# Patient Record
Sex: FEMALE | Race: BLACK OR AFRICAN AMERICAN | NOT HISPANIC OR LATINO | Employment: FULL TIME | URBAN - METROPOLITAN AREA
[De-identification: names, ages, dates, MRNs, and addresses within clinical notes are randomized per-mention and may not be internally consistent; named-entity substitution may affect disease eponyms.]

---

## 2017-04-11 ENCOUNTER — HISTORICAL (OUTPATIENT)
Dept: ADMINISTRATIVE | Facility: HOSPITAL | Age: 37
End: 2017-04-11

## 2017-09-19 ENCOUNTER — HISTORICAL (OUTPATIENT)
Dept: ADMINISTRATIVE | Facility: HOSPITAL | Age: 37
End: 2017-09-19

## 2018-05-02 ENCOUNTER — HISTORICAL (OUTPATIENT)
Dept: ADMINISTRATIVE | Facility: HOSPITAL | Age: 38
End: 2018-05-02

## 2018-05-02 LAB
ABS NEUT (OLG): 3.33 X10(3)/MCL (ref 2.1–9.2)
ALBUMIN SERPL-MCNC: 3.3 GM/DL (ref 3.4–5)
ALBUMIN/GLOB SERPL: 0.9 {RATIO}
ALP SERPL-CCNC: 57 UNIT/L (ref 38–126)
ALT SERPL-CCNC: 30 UNIT/L (ref 12–78)
AST SERPL-CCNC: 15 UNIT/L (ref 15–37)
BASOPHILS # BLD AUTO: 0 X10(3)/MCL (ref 0–0.2)
BASOPHILS NFR BLD AUTO: 0 %
BILIRUB SERPL-MCNC: 0.3 MG/DL (ref 0.2–1)
BILIRUBIN DIRECT+TOT PNL SERPL-MCNC: 0.1 MG/DL (ref 0–0.2)
BILIRUBIN DIRECT+TOT PNL SERPL-MCNC: 0.2 MG/DL (ref 0–0.8)
BUN SERPL-MCNC: 11 MG/DL (ref 7–18)
CALCIUM SERPL-MCNC: 8.7 MG/DL (ref 8.5–10.1)
CHLORIDE SERPL-SCNC: 110 MMOL/L (ref 98–107)
CHOLEST SERPL-MCNC: 183 MG/DL (ref 0–200)
CHOLEST/HDLC SERPL: 2.7 {RATIO} (ref 0–4)
CO2 SERPL-SCNC: 27 MMOL/L (ref 21–32)
CREAT SERPL-MCNC: 0.81 MG/DL (ref 0.55–1.02)
EOSINOPHIL # BLD AUTO: 0.2 X10(3)/MCL (ref 0–0.9)
EOSINOPHIL NFR BLD AUTO: 3 %
ERYTHROCYTE [DISTWIDTH] IN BLOOD BY AUTOMATED COUNT: 14.9 % (ref 11.5–17)
GLOBULIN SER-MCNC: 3.5 GM/DL (ref 2.4–3.5)
GLUCOSE SERPL-MCNC: 87 MG/DL (ref 74–106)
HCT VFR BLD AUTO: 39 % (ref 37–47)
HDLC SERPL-MCNC: 69 MG/DL (ref 35–60)
HGB BLD-MCNC: 12.5 GM/DL (ref 12–16)
IRON SATN MFR SERPL: 7.3 % (ref 20–50)
IRON SERPL-MCNC: 25 MCG/DL (ref 50–175)
LDLC SERPL CALC-MCNC: 98 MG/DL (ref 0–129)
LYMPHOCYTES # BLD AUTO: 1.9 X10(3)/MCL (ref 0.6–4.6)
LYMPHOCYTES NFR BLD AUTO: 32 %
MCH RBC QN AUTO: 27.5 PG (ref 27–31)
MCHC RBC AUTO-ENTMCNC: 32.1 GM/DL (ref 33–36)
MCV RBC AUTO: 85.9 FL (ref 80–94)
MONOCYTES # BLD AUTO: 0.5 X10(3)/MCL (ref 0.1–1.3)
MONOCYTES NFR BLD AUTO: 9 %
NEUTROPHILS # BLD AUTO: 3.33 X10(3)/MCL (ref 1.4–7.9)
NEUTROPHILS NFR BLD AUTO: 56 %
PLATELET # BLD AUTO: 303 X10(3)/MCL (ref 130–400)
PMV BLD AUTO: 9.2 FL (ref 9.4–12.4)
POTASSIUM SERPL-SCNC: 4.5 MMOL/L (ref 3.5–5.1)
PROT SERPL-MCNC: 6.8 GM/DL (ref 6.4–8.2)
RBC # BLD AUTO: 4.54 X10(6)/MCL (ref 4.2–5.4)
SODIUM SERPL-SCNC: 141 MMOL/L (ref 136–145)
T4 FREE SERPL-MCNC: 1.2 NG/DL (ref 0.76–4.46)
TIBC SERPL-MCNC: 341 MCG/DL (ref 250–450)
TRANSFERRIN SERPL-MCNC: 268 MG/DL (ref 200–360)
TRIGL SERPL-MCNC: 78 MG/DL (ref 30–150)
TSH SERPL-ACNC: 1.86 MIU/L (ref 0.36–3.74)
VLDLC SERPL CALC-MCNC: 16 MG/DL
WBC # SPEC AUTO: 6 X10(3)/MCL (ref 4.5–11.5)

## 2018-10-11 ENCOUNTER — HISTORICAL (OUTPATIENT)
Dept: ADMINISTRATIVE | Facility: HOSPITAL | Age: 38
End: 2018-10-11

## 2019-02-05 ENCOUNTER — HISTORICAL (OUTPATIENT)
Dept: ADMINISTRATIVE | Facility: HOSPITAL | Age: 39
End: 2019-02-05

## 2019-02-05 LAB
ABS NEUT (OLG): 2.77 X10(3)/MCL (ref 2.1–9.2)
ALBUMIN SERPL-MCNC: 3.4 GM/DL (ref 3.4–5)
ALBUMIN/GLOB SERPL: 0.9 RATIO (ref 1.1–2)
ALP SERPL-CCNC: 64 UNIT/L (ref 38–126)
ALT SERPL-CCNC: 26 UNIT/L (ref 12–78)
AST SERPL-CCNC: 9 UNIT/L (ref 15–37)
BASOPHILS # BLD AUTO: 0 X10(3)/MCL (ref 0–0.2)
BASOPHILS NFR BLD AUTO: 0 %
BILIRUB SERPL-MCNC: 0.4 MG/DL (ref 0.2–1)
BILIRUBIN DIRECT+TOT PNL SERPL-MCNC: 0.1 MG/DL (ref 0–0.5)
BILIRUBIN DIRECT+TOT PNL SERPL-MCNC: 0.3 MG/DL (ref 0–0.8)
BUN SERPL-MCNC: 9 MG/DL (ref 7–18)
CALCIUM SERPL-MCNC: 9.1 MG/DL (ref 8.5–10.1)
CHLORIDE SERPL-SCNC: 106 MMOL/L (ref 98–107)
CHOLEST SERPL-MCNC: 203 MG/DL (ref 0–200)
CHOLEST/HDLC SERPL: 2.6 {RATIO} (ref 0–4)
CO2 SERPL-SCNC: 28 MMOL/L (ref 21–32)
CREAT SERPL-MCNC: 0.95 MG/DL (ref 0.55–1.02)
EOSINOPHIL # BLD AUTO: 0.1 X10(3)/MCL (ref 0–0.9)
EOSINOPHIL NFR BLD AUTO: 2 %
ERYTHROCYTE [DISTWIDTH] IN BLOOD BY AUTOMATED COUNT: 13.8 % (ref 11.5–17)
GLOBULIN SER-MCNC: 3.9 GM/DL (ref 2.4–3.5)
GLUCOSE SERPL-MCNC: 90 MG/DL (ref 74–106)
HCT VFR BLD AUTO: 43.8 % (ref 37–47)
HDLC SERPL-MCNC: 78 MG/DL (ref 35–60)
HGB BLD-MCNC: 13.8 GM/DL (ref 12–16)
LDLC SERPL CALC-MCNC: 108 MG/DL (ref 0–129)
LYMPHOCYTES # BLD AUTO: 1.8 X10(3)/MCL (ref 0.6–4.6)
LYMPHOCYTES NFR BLD AUTO: 36 %
MCH RBC QN AUTO: 27.8 PG (ref 27–31)
MCHC RBC AUTO-ENTMCNC: 31.5 GM/DL (ref 33–36)
MCV RBC AUTO: 88.1 FL (ref 80–94)
MONOCYTES # BLD AUTO: 0.4 X10(3)/MCL (ref 0.1–1.3)
MONOCYTES NFR BLD AUTO: 8 %
NEUTROPHILS # BLD AUTO: 2.77 X10(3)/MCL (ref 2.1–9.2)
NEUTROPHILS NFR BLD AUTO: 54 %
PLATELET # BLD AUTO: 285 X10(3)/MCL (ref 130–400)
PMV BLD AUTO: 9.3 FL (ref 9.4–12.4)
POTASSIUM SERPL-SCNC: 4.8 MMOL/L (ref 3.5–5.1)
PROT SERPL-MCNC: 7.3 GM/DL (ref 6.4–8.2)
RBC # BLD AUTO: 4.97 X10(6)/MCL (ref 4.2–5.4)
SODIUM SERPL-SCNC: 140 MMOL/L (ref 136–145)
T4 FREE SERPL-MCNC: 1.28 NG/DL (ref 0.76–1.46)
TRIGL SERPL-MCNC: 84 MG/DL (ref 30–150)
TSH SERPL-ACNC: 1.52 MIU/L (ref 0.36–3.74)
VLDLC SERPL CALC-MCNC: 17 MG/DL
WBC # SPEC AUTO: 5.2 X10(3)/MCL (ref 4.5–11.5)

## 2019-10-15 ENCOUNTER — HISTORICAL (OUTPATIENT)
Dept: ADMINISTRATIVE | Facility: HOSPITAL | Age: 39
End: 2019-10-15

## 2020-01-07 ENCOUNTER — HISTORICAL (OUTPATIENT)
Dept: ADMINISTRATIVE | Facility: HOSPITAL | Age: 40
End: 2020-01-07

## 2020-02-11 ENCOUNTER — HISTORICAL (OUTPATIENT)
Dept: ADMINISTRATIVE | Facility: HOSPITAL | Age: 40
End: 2020-02-11

## 2020-05-28 ENCOUNTER — HISTORICAL (OUTPATIENT)
Dept: ADMINISTRATIVE | Facility: HOSPITAL | Age: 40
End: 2020-05-28

## 2020-05-28 LAB
ABS NEUT (OLG): 3.53 X10(3)/MCL (ref 2.1–9.2)
ALBUMIN SERPL-MCNC: 3.4 GM/DL (ref 3.5–5)
ALBUMIN/GLOB SERPL: 0.9 RATIO (ref 1.1–2)
ALP SERPL-CCNC: 60 UNIT/L (ref 40–150)
ALT SERPL-CCNC: 19 UNIT/L (ref 0–55)
AST SERPL-CCNC: 10 UNIT/L (ref 5–34)
BASOPHILS # BLD AUTO: 0 X10(3)/MCL (ref 0–0.2)
BASOPHILS NFR BLD AUTO: 0 %
BILIRUB SERPL-MCNC: 0.2 MG/DL
BILIRUBIN DIRECT+TOT PNL SERPL-MCNC: 0.1 MG/DL (ref 0–0.5)
BILIRUBIN DIRECT+TOT PNL SERPL-MCNC: 0.1 MG/DL (ref 0–0.8)
BUN SERPL-MCNC: 10.1 MG/DL (ref 7–18.7)
CALCIUM SERPL-MCNC: 8.8 MG/DL (ref 8.4–10.2)
CHLORIDE SERPL-SCNC: 107 MMOL/L (ref 98–107)
CHOLEST SERPL-MCNC: 173 MG/DL
CHOLEST/HDLC SERPL: 4 {RATIO} (ref 0–5)
CO2 SERPL-SCNC: 27 MMOL/L (ref 22–29)
CREAT SERPL-MCNC: 0.75 MG/DL (ref 0.55–1.02)
EOSINOPHIL # BLD AUTO: 0.1 X10(3)/MCL (ref 0–0.9)
EOSINOPHIL NFR BLD AUTO: 1 %
ERYTHROCYTE [DISTWIDTH] IN BLOOD BY AUTOMATED COUNT: 15 % (ref 11.5–17)
GLOBULIN SER-MCNC: 3.6 GM/DL (ref 2.4–3.5)
GLUCOSE SERPL-MCNC: 101 MG/DL (ref 74–100)
HCT VFR BLD AUTO: 40.8 % (ref 37–47)
HDLC SERPL-MCNC: 48 MG/DL (ref 35–60)
HGB BLD-MCNC: 12.6 GM/DL (ref 12–16)
IRON SERPL-MCNC: 27 UG/DL (ref 50–170)
LDLC SERPL CALC-MCNC: 113 MG/DL (ref 50–140)
LYMPHOCYTES # BLD AUTO: 1.9 X10(3)/MCL (ref 0.6–4.6)
LYMPHOCYTES NFR BLD AUTO: 32 %
MCH RBC QN AUTO: 27 PG (ref 27–31)
MCHC RBC AUTO-ENTMCNC: 30.9 GM/DL (ref 33–36)
MCV RBC AUTO: 87.6 FL (ref 80–94)
MONOCYTES # BLD AUTO: 0.5 X10(3)/MCL (ref 0.1–1.3)
MONOCYTES NFR BLD AUTO: 8 %
NEUTROPHILS # BLD AUTO: 3.53 X10(3)/MCL (ref 2.1–9.2)
NEUTROPHILS NFR BLD AUTO: 59 %
PLATELET # BLD AUTO: 298 X10(3)/MCL (ref 130–400)
PMV BLD AUTO: 9.7 FL (ref 9.4–12.4)
POTASSIUM SERPL-SCNC: 4.7 MMOL/L (ref 3.5–5.1)
PROT SERPL-MCNC: 7 GM/DL (ref 6.4–8.3)
RBC # BLD AUTO: 4.66 X10(6)/MCL (ref 4.2–5.4)
SODIUM SERPL-SCNC: 140 MMOL/L (ref 136–145)
T4 FREE SERPL-MCNC: 1.18 NG/DL (ref 0.7–1.48)
TRIGL SERPL-MCNC: 61 MG/DL (ref 37–140)
TSH SERPL-ACNC: 0.2 UIU/ML (ref 0.35–4.94)
VLDLC SERPL CALC-MCNC: 12 MG/DL
WBC # SPEC AUTO: 6 X10(3)/MCL (ref 4.5–11.5)

## 2021-03-30 LAB — SARS-COV-2 RNA RESP QL NAA+PROBE: NEGATIVE

## 2021-06-16 ENCOUNTER — HISTORICAL (OUTPATIENT)
Dept: ADMINISTRATIVE | Facility: HOSPITAL | Age: 41
End: 2021-06-16

## 2021-06-16 LAB
ABS NEUT (OLG): 3.82 X10(3)/MCL (ref 2.1–9.2)
ALBUMIN SERPL-MCNC: 2.9 GM/DL (ref 3.5–5)
ALBUMIN/GLOB SERPL: 0.8 RATIO (ref 1.1–2)
ALP SERPL-CCNC: 63 UNIT/L (ref 40–150)
ALT SERPL-CCNC: 16 UNIT/L (ref 0–55)
AST SERPL-CCNC: 10 UNIT/L (ref 5–34)
BASOPHILS # BLD AUTO: 0 X10(3)/MCL (ref 0–0.2)
BASOPHILS NFR BLD AUTO: 0 %
BILIRUB SERPL-MCNC: 0.2 MG/DL
BILIRUBIN DIRECT+TOT PNL SERPL-MCNC: 0.1 MG/DL (ref 0–0.5)
BILIRUBIN DIRECT+TOT PNL SERPL-MCNC: 0.1 MG/DL (ref 0–0.8)
BUN SERPL-MCNC: 9.2 MG/DL (ref 7–18.7)
CALCIUM SERPL-MCNC: 9 MG/DL (ref 8.4–10.2)
CHLORIDE SERPL-SCNC: 108 MMOL/L (ref 98–107)
CHOLEST SERPL-MCNC: 174 MG/DL
CHOLEST/HDLC SERPL: 3 {RATIO} (ref 0–5)
CO2 SERPL-SCNC: 26 MMOL/L (ref 22–29)
CREAT SERPL-MCNC: 0.81 MG/DL (ref 0.55–1.02)
DEPRECATED CALCIDIOL+CALCIFEROL SERPL-MC: 8.8 NG/ML (ref 30–80)
EOSINOPHIL # BLD AUTO: 0.1 X10(3)/MCL (ref 0–0.9)
EOSINOPHIL NFR BLD AUTO: 2 %
ERYTHROCYTE [DISTWIDTH] IN BLOOD BY AUTOMATED COUNT: 15 % (ref 11.5–17)
EST. AVERAGE GLUCOSE BLD GHB EST-MCNC: 114 MG/DL
GLOBULIN SER-MCNC: 3.7 GM/DL (ref 2.4–3.5)
GLUCOSE SERPL-MCNC: 109 MG/DL (ref 74–100)
HBA1C MFR BLD: 5.6 %
HCT VFR BLD AUTO: 37.7 % (ref 37–47)
HDLC SERPL-MCNC: 53 MG/DL (ref 35–60)
HGB BLD-MCNC: 11.7 GM/DL (ref 12–16)
LDLC SERPL CALC-MCNC: 104 MG/DL (ref 50–140)
LYMPHOCYTES # BLD AUTO: 2 X10(3)/MCL (ref 0.6–4.6)
LYMPHOCYTES NFR BLD AUTO: 31 %
MCH RBC QN AUTO: 26.2 PG (ref 27–31)
MCHC RBC AUTO-ENTMCNC: 31 GM/DL (ref 33–36)
MCV RBC AUTO: 84.5 FL (ref 80–94)
MONOCYTES # BLD AUTO: 0.4 X10(3)/MCL (ref 0.1–1.3)
MONOCYTES NFR BLD AUTO: 6 %
NEUTROPHILS # BLD AUTO: 3.82 X10(3)/MCL (ref 2.1–9.2)
NEUTROPHILS NFR BLD AUTO: 60 %
PLATELET # BLD AUTO: 335 X10(3)/MCL (ref 130–400)
PMV BLD AUTO: 9.5 FL (ref 9.4–12.4)
POTASSIUM SERPL-SCNC: 4.7 MMOL/L (ref 3.5–5.1)
PROT SERPL-MCNC: 6.6 GM/DL (ref 6.4–8.3)
RBC # BLD AUTO: 4.46 X10(6)/MCL (ref 4.2–5.4)
SODIUM SERPL-SCNC: 140 MMOL/L (ref 136–145)
T4 FREE SERPL-MCNC: 0.84 NG/DL (ref 0.7–1.48)
TRIGL SERPL-MCNC: 83 MG/DL (ref 37–140)
TSH SERPL-ACNC: 3.68 UIU/ML (ref 0.35–4.94)
VLDLC SERPL CALC-MCNC: 17 MG/DL
WBC # SPEC AUTO: 6.4 X10(3)/MCL (ref 4.5–11.5)

## 2022-01-20 ENCOUNTER — HISTORICAL (OUTPATIENT)
Dept: ADMINISTRATIVE | Facility: HOSPITAL | Age: 42
End: 2022-01-20

## 2022-01-20 LAB
ABS NEUT (OLG): 3.91 X10(3)/MCL (ref 2.1–9.2)
ALBUMIN SERPL-MCNC: 3.3 GM/DL (ref 3.5–5)
ALBUMIN/GLOB SERPL: 0.8 RATIO (ref 1.1–2)
ALP SERPL-CCNC: 61 UNIT/L (ref 40–150)
ALT SERPL-CCNC: 13 UNIT/L (ref 0–55)
ANTINUCLEAR ANTIBODY SCREEN (OHS): NEGATIVE
APPEARANCE, UA: ABNORMAL
AST SERPL-CCNC: 11 UNIT/L (ref 5–34)
BACTERIA SPEC CULT: ABNORMAL /HPF
BASOPHILS # BLD AUTO: 0 X10(3)/MCL (ref 0–0.2)
BASOPHILS NFR BLD AUTO: 1 %
BILIRUB SERPL-MCNC: 0.4 MG/DL
BILIRUB UR QL STRIP: NEGATIVE
BILIRUBIN DIRECT+TOT PNL SERPL-MCNC: 0.2 MG/DL (ref 0–0.5)
BILIRUBIN DIRECT+TOT PNL SERPL-MCNC: 0.2 MG/DL (ref 0–0.8)
BUN SERPL-MCNC: 7.7 MG/DL (ref 7–18.7)
CALCIUM SERPL-MCNC: 8.9 MG/DL (ref 8.7–10.5)
CHLORIDE SERPL-SCNC: 107 MMOL/L (ref 98–107)
CHOLEST SERPL-MCNC: 186 MG/DL
CHOLEST/HDLC SERPL: 3 {RATIO} (ref 0–5)
CO2 SERPL-SCNC: 22 MMOL/L (ref 22–29)
COLOR UR: YELLOW
CREAT SERPL-MCNC: 0.79 MG/DL (ref 0.55–1.02)
DEPRECATED CALCIDIOL+CALCIFEROL SERPL-MC: 8.1 NG/ML (ref 30–80)
DSDNA ANTIBODY (OHS): NEGATIVE
EOSINOPHIL # BLD AUTO: 0.1 X10(3)/MCL (ref 0–0.9)
EOSINOPHIL NFR BLD AUTO: 2 %
ERYTHROCYTE [DISTWIDTH] IN BLOOD BY AUTOMATED COUNT: 15.1 % (ref 11.5–17)
ERYTHROCYTE [SEDIMENTATION RATE] IN BLOOD: 34 MM/HR (ref 0–20)
EST. AVERAGE GLUCOSE BLD GHB EST-MCNC: 99.7 MG/DL
GLOBULIN SER-MCNC: 4.2 GM/DL (ref 2.4–3.5)
GLUCOSE (UA): NEGATIVE
GLUCOSE SERPL-MCNC: 93 MG/DL (ref 74–100)
HBA1C MFR BLD: 5.1 %
HCT VFR BLD AUTO: 38.1 % (ref 37–47)
HDLC SERPL-MCNC: 55 MG/DL (ref 35–60)
HGB BLD-MCNC: 12 GM/DL (ref 12–16)
HGB UR QL STRIP: ABNORMAL
KETONES UR QL STRIP: NEGATIVE
LDLC SERPL CALC-MCNC: 114 MG/DL (ref 50–140)
LEUKOCYTE ESTERASE UR QL STRIP: ABNORMAL
LYMPHOCYTES # BLD AUTO: 2.3 X10(3)/MCL (ref 0.6–4.6)
LYMPHOCYTES NFR BLD AUTO: 33 %
MCH RBC QN AUTO: 25.9 PG (ref 27–31)
MCHC RBC AUTO-ENTMCNC: 31.5 GM/DL (ref 33–36)
MCV RBC AUTO: 82.3 FL (ref 80–94)
MONOCYTES # BLD AUTO: 0.4 X10(3)/MCL (ref 0.1–1.3)
MONOCYTES NFR BLD AUTO: 7 %
NEUTROPHILS # BLD AUTO: 3.91 X10(3)/MCL (ref 2.1–9.2)
NEUTROPHILS NFR BLD AUTO: 57 %
NITRITE UR QL STRIP: NEGATIVE
PH UR STRIP: 5.5 [PH] (ref 5–9)
PLATELET # BLD AUTO: 361 X10(3)/MCL (ref 130–400)
PMV BLD AUTO: 10 FL (ref 9.4–12.4)
POTASSIUM SERPL-SCNC: 3.7 MMOL/L (ref 3.5–5.1)
PROT SERPL-MCNC: 7.5 GM/DL (ref 6.4–8.3)
PROT UR QL STRIP: NEGATIVE
RBC # BLD AUTO: 4.63 X10(6)/MCL (ref 4.2–5.4)
RBC #/AREA URNS HPF: ABNORMAL /HPF
RHEUMATOID FACT SERPL-ACNC: <13 IU/ML
SODIUM SERPL-SCNC: 139 MMOL/L (ref 136–145)
SP GR UR STRIP: 1.02 (ref 1–1.03)
SQUAMOUS EPITHELIAL, UA: ABNORMAL /HPF
T4 FREE SERPL-MCNC: 1.05 NG/DL (ref 0.7–1.48)
TRIGL SERPL-MCNC: 86 MG/DL (ref 37–140)
TSH SERPL-ACNC: 1.15 UIU/ML (ref 0.35–4.94)
UA WBC MAN: ABNORMAL /HPF
URATE SERPL-MCNC: 5.3 MG/DL (ref 2.6–6)
UROBILINOGEN UR STRIP-ACNC: 0.2
VLDLC SERPL CALC-MCNC: 17 MG/DL
WBC # SPEC AUTO: 6.8 X10(3)/MCL (ref 4.5–11.5)
WBC #/AREA URNS AUTO: ABNORMAL /HPF (ref 0–3)
WBC #/AREA URNS HPF: ABNORMAL /HPF (ref 0–3)

## 2022-01-22 LAB — FINAL CULTURE: NORMAL

## 2022-04-11 ENCOUNTER — HISTORICAL (OUTPATIENT)
Dept: ADMINISTRATIVE | Facility: HOSPITAL | Age: 42
End: 2022-04-11
Payer: COMMERCIAL

## 2022-04-28 VITALS
DIASTOLIC BLOOD PRESSURE: 81 MMHG | SYSTOLIC BLOOD PRESSURE: 105 MMHG | WEIGHT: 293 LBS | OXYGEN SATURATION: 98 % | BODY MASS INDEX: 48.82 KG/M2 | HEIGHT: 65 IN

## 2022-09-01 ENCOUNTER — LAB VISIT (OUTPATIENT)
Dept: LAB | Facility: HOSPITAL | Age: 42
End: 2022-09-01
Attending: OBSTETRICS & GYNECOLOGY
Payer: COMMERCIAL

## 2022-09-01 DIAGNOSIS — I51.9 MYXEDEMA HEART DISEASE: ICD-10-CM

## 2022-09-01 DIAGNOSIS — E05.90 HYPERTHYROIDISM, MATERNAL, ANTEPARTUM, SECOND TRIMESTER: Primary | ICD-10-CM

## 2022-09-01 DIAGNOSIS — E03.9 MYXEDEMA HEART DISEASE: ICD-10-CM

## 2022-09-01 LAB
T4 FREE SERPL-MCNC: 0.82 NG/DL (ref 0.7–1.48)
TSH SERPL-ACNC: 1.58 UIU/ML (ref 0.35–4.94)

## 2022-09-01 PROCEDURE — 36415 COLL VENOUS BLD VENIPUNCTURE: CPT

## 2022-09-01 PROCEDURE — 84439 ASSAY OF FREE THYROXINE: CPT

## 2022-09-01 PROCEDURE — 84443 ASSAY THYROID STIM HORMONE: CPT

## 2022-09-21 ENCOUNTER — HISTORICAL (OUTPATIENT)
Dept: ADMINISTRATIVE | Facility: HOSPITAL | Age: 42
End: 2022-09-21
Payer: COMMERCIAL

## 2023-01-20 ENCOUNTER — OFFICE VISIT (OUTPATIENT)
Dept: ORTHOPEDICS | Facility: CLINIC | Age: 43
End: 2023-01-20
Payer: COMMERCIAL

## 2023-01-20 ENCOUNTER — HOSPITAL ENCOUNTER (OUTPATIENT)
Dept: RADIOLOGY | Facility: CLINIC | Age: 43
Discharge: HOME | End: 2023-01-20
Attending: ORTHOPAEDIC SURGERY
Payer: COMMERCIAL

## 2023-01-20 VITALS
SYSTOLIC BLOOD PRESSURE: 138 MMHG | HEIGHT: 64 IN | WEIGHT: 293 LBS | DIASTOLIC BLOOD PRESSURE: 101 MMHG | BODY MASS INDEX: 50.02 KG/M2 | HEART RATE: 73 BPM

## 2023-01-20 DIAGNOSIS — M25.561 ACUTE PAIN OF RIGHT KNEE: ICD-10-CM

## 2023-01-20 DIAGNOSIS — E66.01 MORBID OBESITY: ICD-10-CM

## 2023-01-20 DIAGNOSIS — M17.0 PRIMARY OSTEOARTHRITIS OF BOTH KNEES: Primary | ICD-10-CM

## 2023-01-20 DIAGNOSIS — M25.562 ACUTE PAIN OF LEFT KNEE: ICD-10-CM

## 2023-01-20 PROCEDURE — 3075F PR MOST RECENT SYSTOLIC BLOOD PRESS GE 130-139MM HG: ICD-10-PCS | Mod: CPTII,,, | Performed by: ORTHOPAEDIC SURGERY

## 2023-01-20 PROCEDURE — 3075F SYST BP GE 130 - 139MM HG: CPT | Mod: CPTII,,, | Performed by: ORTHOPAEDIC SURGERY

## 2023-01-20 PROCEDURE — 1159F MED LIST DOCD IN RCRD: CPT | Mod: CPTII,,, | Performed by: ORTHOPAEDIC SURGERY

## 2023-01-20 PROCEDURE — 3080F PR MOST RECENT DIASTOLIC BLOOD PRESSURE >= 90 MM HG: ICD-10-PCS | Mod: CPTII,,, | Performed by: ORTHOPAEDIC SURGERY

## 2023-01-20 PROCEDURE — 73562 XR KNEE 3 VIEW BILATERAL: ICD-10-PCS | Mod: ,,, | Performed by: ORTHOPAEDIC SURGERY

## 2023-01-20 PROCEDURE — 20610 DRAIN/INJ JOINT/BURSA W/O US: CPT | Mod: 50,,, | Performed by: ORTHOPAEDIC SURGERY

## 2023-01-20 PROCEDURE — 3080F DIAST BP >= 90 MM HG: CPT | Mod: CPTII,,, | Performed by: ORTHOPAEDIC SURGERY

## 2023-01-20 PROCEDURE — 3008F BODY MASS INDEX DOCD: CPT | Mod: CPTII,,, | Performed by: ORTHOPAEDIC SURGERY

## 2023-01-20 PROCEDURE — 20610 LARGE JOINT ASPIRATION/INJECTION: BILATERAL KNEE: ICD-10-PCS | Mod: 50,,, | Performed by: ORTHOPAEDIC SURGERY

## 2023-01-20 PROCEDURE — 99214 OFFICE O/P EST MOD 30 MIN: CPT | Mod: 25,,, | Performed by: ORTHOPAEDIC SURGERY

## 2023-01-20 PROCEDURE — 1160F RVW MEDS BY RX/DR IN RCRD: CPT | Mod: CPTII,,, | Performed by: ORTHOPAEDIC SURGERY

## 2023-01-20 PROCEDURE — 4010F PR ACE/ARB THEARPY RXD/TAKEN: ICD-10-PCS | Mod: CPTII,,, | Performed by: ORTHOPAEDIC SURGERY

## 2023-01-20 PROCEDURE — 73562 X-RAY EXAM OF KNEE 3: CPT | Mod: ,,, | Performed by: ORTHOPAEDIC SURGERY

## 2023-01-20 PROCEDURE — 99214 PR OFFICE/OUTPT VISIT, EST, LEVL IV, 30-39 MIN: ICD-10-PCS | Mod: 25,,, | Performed by: ORTHOPAEDIC SURGERY

## 2023-01-20 PROCEDURE — 4010F ACE/ARB THERAPY RXD/TAKEN: CPT | Mod: CPTII,,, | Performed by: ORTHOPAEDIC SURGERY

## 2023-01-20 PROCEDURE — 1159F PR MEDICATION LIST DOCUMENTED IN MEDICAL RECORD: ICD-10-PCS | Mod: CPTII,,, | Performed by: ORTHOPAEDIC SURGERY

## 2023-01-20 PROCEDURE — 3008F PR BODY MASS INDEX (BMI) DOCUMENTED: ICD-10-PCS | Mod: CPTII,,, | Performed by: ORTHOPAEDIC SURGERY

## 2023-01-20 PROCEDURE — 1160F PR REVIEW ALL MEDS BY PRESCRIBER/CLIN PHARMACIST DOCUMENTED: ICD-10-PCS | Mod: CPTII,,, | Performed by: ORTHOPAEDIC SURGERY

## 2023-01-20 RX ORDER — PROGESTERONE 200 MG/1
CAPSULE ORAL
COMMUNITY
Start: 2022-08-09

## 2023-01-20 RX ORDER — LEVOTHYROXINE SODIUM 125 UG/1
125 TABLET ORAL
COMMUNITY
Start: 2022-11-09 | End: 2024-03-21

## 2023-01-20 RX ORDER — ACETAMINOPHEN AND CODEINE PHOSPHATE 120; 12 MG/5ML; MG/5ML
1 SOLUTION ORAL
COMMUNITY
Start: 2023-01-14

## 2023-01-20 RX ORDER — LISINOPRIL 5 MG/1
5 TABLET ORAL
COMMUNITY
Start: 2023-01-16

## 2023-01-20 RX ORDER — BETAMETHASONE SODIUM PHOSPHATE AND BETAMETHASONE ACETATE 3; 3 MG/ML; MG/ML
6 INJECTION, SUSPENSION INTRA-ARTICULAR; INTRALESIONAL; INTRAMUSCULAR; SOFT TISSUE
Status: DISCONTINUED | OUTPATIENT
Start: 2023-01-20 | End: 2023-01-20 | Stop reason: HOSPADM

## 2023-01-20 RX ORDER — LIDOCAINE HYDROCHLORIDE 20 MG/ML
2 INJECTION, SOLUTION INFILTRATION; PERINEURAL
Status: DISCONTINUED | OUTPATIENT
Start: 2023-01-20 | End: 2023-01-20 | Stop reason: HOSPADM

## 2023-01-20 RX ADMIN — LIDOCAINE HYDROCHLORIDE 2 MG: 20 INJECTION, SOLUTION INFILTRATION; PERINEURAL at 08:01

## 2023-01-20 RX ADMIN — BETAMETHASONE SODIUM PHOSPHATE AND BETAMETHASONE ACETATE 6 MG: 3; 3 INJECTION, SUSPENSION INTRA-ARTICULAR; INTRALESIONAL; INTRAMUSCULAR; SOFT TISSUE at 08:01

## 2023-01-20 NOTE — PROCEDURES
Large Joint Aspiration/Injection: bilateral knee    Date/Time: 1/20/2023 8:45 AM  Performed by: Kan Chiu MD  Authorized by: Kan Chiu MD     Consent Done?:  Yes (Verbal)  Indications:  Arthritis and pain  Timeout: prior to procedure the correct patient, procedure, and site was verified    Prep: patient was prepped and draped in usual sterile fashion    Local anesthesia used?: No      Details:  Needle Size:  25 G  Approach:  Anterolateral  Location:  Knee  Laterality:  Bilateral  Site:  Bilateral knee  Medications (Right):  2 mg LIDOcaine HCL 20 mg/ml (2%) 20 mg/mL (2 %); 6 mg betamethasone acetate-betamethasone sodium phosphate 6 mg/mL  Medications (Left):  2 mg LIDOcaine HCL 20 mg/ml (2%) 20 mg/mL (2 %); 6 mg betamethasone acetate-betamethasone sodium phosphate 6 mg/mL  Patient tolerance:  Patient tolerated the procedure well with no immediate complications

## 2023-01-20 NOTE — PROGRESS NOTES
"History of present illness:    This is a 41-year-old female with a history of left knee osteoarthritis that has had multiple left knee Synvisc injections. She comes in today for another Synvisc injection. She continues to have symptoms related to her knee arthritis.  2023 this patient comes in today complaints of bilateral knee pain.  She is had Synvisc in the past and states it was helpful.  She is interested in do another round of Synvisc injections.    Past Medical History:   Diagnosis Date    Thyroid disease        Past Surgical History:   Procedure Laterality Date     SECTION         Current Outpatient Medications   Medication Sig    levothyroxine (SYNTHROID) 125 MCG tablet Take 125 mcg by mouth.    lisinopriL (PRINIVIL,ZESTRIL) 5 MG tablet Take 5 mg by mouth.    norethindrone (MICRONOR) 0.35 mg tablet Take 1 tablet by mouth.    progesterone (PROMETRIUM) 200 MG capsule INSERT 1 CAPSULE IN THE VAGINA EVERY NIGHT AT BEDTIME AS DIRECTED     No current facility-administered medications for this visit.       Review of patient's allergies indicates:  No Known Allergies    Family History   Problem Relation Age of Onset    No Known Problems Mother     No Known Problems Father     No Known Problems Sister     No Known Problems Brother        Social History     Socioeconomic History    Marital status: Single   Tobacco Use    Smoking status: Unknown   Substance and Sexual Activity    Alcohol use: Not Currently    Drug use: Never    Sexual activity: Not Currently       Chief Complaint:   Chief Complaint   Patient presents with    Right Knee - Follow-up     familia knee pain.previously has had synvisc inj which provided relief.        Consulting Physician: No ref. provider found      Review of Systems:    All review of systems negative except for those stated in the HPI    Examination:    Vital Signs:    Vitals:    23 0839   BP: (!) 138/101   Pulse: 73   Weight: 133.8 kg (295 lb)   Height: 5' 4" (1.626 m) "   PainSc:   4       Body mass index is 50.64 kg/m².    Physical Exam:   General: Well-developed, well-nourished.  Neuro: Alert and oriented x 3.  Psych: Normal mood and affect.  Left knee Exam:  Varus deformity. Range of motion from 0-130 degrees. Negative patella grind and equal subluxation of knee cap medial and lateral < 1cm. Negative patella tendon tenderness. Negative Lachman and anterior drawer test. Negative posterior drawer test. Negative varus and valgus stress test. Positive medial joint line tenderness. Negative lateral joint line tenderness. 4/5 strength and normal skin appearance. Sensibility normal.    Imaging: X-rays ordered and images interpreted today personally by me of X-rays demonstrate views of the knee joint space narrowing and degenerative changes with a varus deformity. There is also subchondral sclerosis and osteophyte formation which is equivalent to a Kellgren Chaz grade 4.         Assessment: Primary osteoarthritis of both knees  -     Large Joint Aspiration/Injection: bilateral knee    Morbid obesity    Acute pain of left knee  -     Cancel: X-Ray Knee 3 View Left; Future; Expected date: 01/20/2023    Acute pain of right knee  -     Cancel: X-Ray Knee 3 View Right; Future; Expected date: 01/20/2023        Plan:    We will proceed with bilateral knee steroid injections today.  I will bring her back in approximately 2 weeks for bilateral Synvisc injections.  Also did have a long conversation with her about weight loss.  I will make a referral to bariatric surgery and the weight loss clinic for this patient.      Large Joint Aspiration/Injection: bilateral knee    Date/Time: 1/20/2023 8:45 AM  Performed by: Kan Chiu MD  Authorized by: Kan Chiu MD     Consent Done?:  Yes (Verbal)  Indications:  Arthritis and pain  Timeout: prior to procedure the correct patient, procedure, and site was verified    Prep: patient was prepped and draped in usual sterile fashion    Local anesthesia  used?: No      Details:  Needle Size:  25 G  Approach:  Anterolateral  Location:  Knee  Laterality:  Bilateral  Site:  Bilateral knee  Medications (Right):  2 mg LIDOcaine HCL 20 mg/ml (2%) 20 mg/mL (2 %); 6 mg betamethasone acetate-betamethasone sodium phosphate 6 mg/mL  Medications (Left):  2 mg LIDOcaine HCL 20 mg/ml (2%) 20 mg/mL (2 %); 6 mg betamethasone acetate-betamethasone sodium phosphate 6 mg/mL  Patient tolerance:  Patient tolerated the procedure well with no immediate complications     DISCLAIMER: This note may have been dictated using voice recognition software and may contain grammatical errors.     NOTE: Consult report sent to referring provider via [employer] EMR.

## 2023-02-23 ENCOUNTER — OFFICE VISIT (OUTPATIENT)
Dept: ORTHOPEDICS | Facility: CLINIC | Age: 43
End: 2023-02-23
Payer: COMMERCIAL

## 2023-02-23 VITALS
WEIGHT: 290 LBS | HEART RATE: 81 BPM | HEIGHT: 64 IN | DIASTOLIC BLOOD PRESSURE: 82 MMHG | SYSTOLIC BLOOD PRESSURE: 118 MMHG | BODY MASS INDEX: 49.51 KG/M2

## 2023-02-23 DIAGNOSIS — E66.01 MORBID OBESITY: ICD-10-CM

## 2023-02-23 DIAGNOSIS — M17.0 PRIMARY OSTEOARTHRITIS OF BOTH KNEES: Primary | ICD-10-CM

## 2023-02-23 PROCEDURE — 4010F ACE/ARB THERAPY RXD/TAKEN: CPT | Mod: CPTII,,,

## 2023-02-23 PROCEDURE — 99499 NO LOS: ICD-10-PCS | Mod: ,,,

## 2023-02-23 PROCEDURE — 1159F PR MEDICATION LIST DOCUMENTED IN MEDICAL RECORD: ICD-10-PCS | Mod: CPTII,,,

## 2023-02-23 PROCEDURE — 3008F PR BODY MASS INDEX (BMI) DOCUMENTED: ICD-10-PCS | Mod: CPTII,,,

## 2023-02-23 PROCEDURE — 3079F PR MOST RECENT DIASTOLIC BLOOD PRESSURE 80-89 MM HG: ICD-10-PCS | Mod: CPTII,,,

## 2023-02-23 PROCEDURE — 1160F RVW MEDS BY RX/DR IN RCRD: CPT | Mod: CPTII,,,

## 2023-02-23 PROCEDURE — 3079F DIAST BP 80-89 MM HG: CPT | Mod: CPTII,,,

## 2023-02-23 PROCEDURE — 3074F PR MOST RECENT SYSTOLIC BLOOD PRESSURE < 130 MM HG: ICD-10-PCS | Mod: CPTII,,,

## 2023-02-23 PROCEDURE — 3074F SYST BP LT 130 MM HG: CPT | Mod: CPTII,,,

## 2023-02-23 PROCEDURE — 4010F PR ACE/ARB THEARPY RXD/TAKEN: ICD-10-PCS | Mod: CPTII,,,

## 2023-02-23 PROCEDURE — 20610 DRAIN/INJ JOINT/BURSA W/O US: CPT | Mod: 50,,,

## 2023-02-23 PROCEDURE — 1159F MED LIST DOCD IN RCRD: CPT | Mod: CPTII,,,

## 2023-02-23 PROCEDURE — 1160F PR REVIEW ALL MEDS BY PRESCRIBER/CLIN PHARMACIST DOCUMENTED: ICD-10-PCS | Mod: CPTII,,,

## 2023-02-23 PROCEDURE — 20610 LARGE JOINT ASPIRATION/INJECTION: BILATERAL KNEE: ICD-10-PCS | Mod: 50,,,

## 2023-02-23 PROCEDURE — 3008F BODY MASS INDEX DOCD: CPT | Mod: CPTII,,,

## 2023-02-23 PROCEDURE — 99499 UNLISTED E&M SERVICE: CPT | Mod: ,,,

## 2023-02-23 RX ORDER — LIDOCAINE HYDROCHLORIDE 20 MG/ML
3 INJECTION, SOLUTION INFILTRATION; PERINEURAL
Status: DISCONTINUED | OUTPATIENT
Start: 2023-02-23 | End: 2023-02-23 | Stop reason: HOSPADM

## 2023-02-23 RX ADMIN — LIDOCAINE HYDROCHLORIDE 3 MG: 20 INJECTION, SOLUTION INFILTRATION; PERINEURAL at 08:02

## 2023-02-23 NOTE — PROGRESS NOTES
[facility]  Orthopedic Clinic Note      Chief Complaint:   Chief Complaint   Patient presents with    Left Knee - Pain    Right Knee - Pain    Injections     here for bilateral knee injections, still has pain in both knees     Referring Physician: Kan Chiu MD      History of present illness:    This is a 41-year-old female with a history of left knee osteoarthritis that has had multiple left knee Synvisc injections. She comes in today for another Synvisc injection. She continues to have symptoms related to her knee arthritis.  2023 this patient comes in today complaints of bilateral knee pain.  She is had Synvisc in the past and states it was helpful.  She is interested in do another round of Synvisc injections.  2023 patient presents for bilateral knee viscosupplementation injections today.  She received bilateral knee corticosteroid injections at her prior visit.  She actually reports decent relief of symptoms following the corticosteroid injections.  However, her symptoms have slowly returned over the last few days.      Past Medical History:   Diagnosis Date    Thyroid disease        Past Surgical History:   Procedure Laterality Date     SECTION         Current Outpatient Medications   Medication Sig    levothyroxine (SYNTHROID) 125 MCG tablet Take 125 mcg by mouth.    lisinopriL (PRINIVIL,ZESTRIL) 5 MG tablet Take 5 mg by mouth.    norethindrone (MICRONOR) 0.35 mg tablet Take 1 tablet by mouth.    progesterone (PROMETRIUM) 200 MG capsule INSERT 1 CAPSULE IN THE VAGINA EVERY NIGHT AT BEDTIME AS DIRECTED     No current facility-administered medications for this visit.       Review of patient's allergies indicates:  No Known Allergies    Family History   Problem Relation Age of Onset    No Known Problems Mother     No Known Problems Father     No Known Problems Sister     No Known Problems Brother        Social History     Socioeconomic History    Marital status: Single  "  Tobacco Use    Smoking status: Never    Smokeless tobacco: Never   Substance and Sexual Activity    Alcohol use: Not Currently    Drug use: Never    Sexual activity: Not Currently       Chief Complaint:   Chief Complaint   Patient presents with    Left Knee - Pain    Right Knee - Pain    Injections     here for bilateral knee injections, still has pain in both knees       Consulting Physician: Kan Chiu MD      Review of Systems:    All review of systems negative except for those stated in the HPI    Examination:    Vital Signs:    Vitals:    02/23/23 0902 02/23/23 0904   BP: 118/82    Pulse: 81    Weight: 131.5 kg (290 lb)    Height: 5' 4" (1.626 m)    PainSc:    4       Body mass index is 49.78 kg/m².    Physical Exam:   General: Well-developed, well-nourished.  Neuro: Alert and oriented x 3.  Psych: Normal mood and affect.  Bilateral knee Exam:  Varus deformity. Range of motion from 0-130 degrees. Negative patella grind and equal subluxation of knee cap medial and lateral < 1cm. Negative patella tendon tenderness. Negative Lachman and anterior drawer test. Negative posterior drawer test. Negative varus and valgus stress test. Positive medial joint line tenderness. Negative lateral joint line tenderness. 4/5 strength and normal skin appearance. Sensibility normal.       Assessment: Primary osteoarthritis of both knees  -     Large Joint Aspiration/Injection: bilateral knee    Morbid obesity        Plan:  Plan to proceed with bilateral knee viscosupplementation injections today.  Encouraged her to utilize over-the-counter medications as needed for pain.  She will return to clinic in approximately 3-4 months for re-evaluation.  She verbalized understanding of the plan of care with no further questions.      Large Joint Aspiration/Injection: bilateral knee    Date/Time: 2/23/2023 8:45 AM  Performed by: ROSALIO Calhoun  Authorized by: ROSALIO Calhoun     Consent Done?:  Yes (Verbal)  Indications:  " Pain  Site marked: the procedure site was marked    Timeout: prior to procedure the correct patient, procedure, and site was verified    Prep: patient was prepped and draped in usual sterile fashion      Local anesthesia used?: Yes    Local anesthetic:  Lidocaine 1% without epinephrine    Details:  Needle Size:  25 G  Approach:  Superior  Location:  Knee  Laterality:  Bilateral  Site:  Bilateral knee  Medications (Right):  48 mg hylan g-f 20 48 mg/6 mL; 3 mg LIDOcaine HCL 20 mg/ml (2%) 20 mg/mL (2 %)  Medications (Left):  48 mg hylan g-f 20 48 mg/6 mL; 3 mg LIDOcaine HCL 20 mg/ml (2%) 20 mg/mL (2 %)  Patient tolerance:  Patient tolerated the procedure well with no immediate complications     Follow up in about 4 months (around 6/23/2023).      DISCLAIMER: This note may have been dictated using voice recognition software and may contain grammatical errors.     NOTE: Consult report sent to referring provider via [employer] EMR.

## 2023-02-23 NOTE — PROCEDURES
Large Joint Aspiration/Injection: bilateral knee    Date/Time: 2/23/2023 8:45 AM  Performed by: ROSALIO Calhoun  Authorized by: ROSALIO Calhoun     Consent Done?:  Yes (Verbal)  Indications:  Pain  Site marked: the procedure site was marked    Timeout: prior to procedure the correct patient, procedure, and site was verified    Prep: patient was prepped and draped in usual sterile fashion      Local anesthesia used?: Yes    Local anesthetic:  Lidocaine 1% without epinephrine    Details:  Needle Size:  25 G  Approach:  Superior  Location:  Knee  Laterality:  Bilateral  Site:  Bilateral knee  Medications (Right):  48 mg hylan g-f 20 48 mg/6 mL; 3 mg LIDOcaine HCL 20 mg/ml (2%) 20 mg/mL (2 %)  Medications (Left):  48 mg hylan g-f 20 48 mg/6 mL; 3 mg LIDOcaine HCL 20 mg/ml (2%) 20 mg/mL (2 %)  Patient tolerance:  Patient tolerated the procedure well with no immediate complications

## 2023-05-03 ENCOUNTER — HOSPITAL ENCOUNTER (EMERGENCY)
Facility: HOSPITAL | Age: 43
Discharge: HOME | End: 2023-05-03
Attending: EMERGENCY MEDICINE
Payer: COMMERCIAL

## 2023-05-03 VITALS
WEIGHT: 293 LBS | RESPIRATION RATE: 14 BRPM | HEIGHT: 65 IN | DIASTOLIC BLOOD PRESSURE: 101 MMHG | OXYGEN SATURATION: 100 % | HEART RATE: 70 BPM | SYSTOLIC BLOOD PRESSURE: 142 MMHG | TEMPERATURE: 98 F | BODY MASS INDEX: 48.82 KG/M2

## 2023-05-03 DIAGNOSIS — R07.89 CHEST WALL PAIN: ICD-10-CM

## 2023-05-03 DIAGNOSIS — R07.89 COSTOCHONDRAL CHEST PAIN: ICD-10-CM

## 2023-05-03 DIAGNOSIS — R07.9 CHEST PAIN: Primary | ICD-10-CM

## 2023-05-03 LAB
ALBUMIN SERPL-MCNC: 3.7 G/DL (ref 3.5–5)
ALBUMIN/GLOB SERPL: 0.9 RATIO (ref 1.1–2)
ALP SERPL-CCNC: 75 UNIT/L (ref 40–150)
ALT SERPL-CCNC: 21 UNIT/L (ref 0–55)
AST SERPL-CCNC: 15 UNIT/L (ref 5–34)
BASOPHILS # BLD AUTO: 0.04 X10(3)/MCL
BASOPHILS NFR BLD AUTO: 0.5 %
BILIRUBIN DIRECT+TOT PNL SERPL-MCNC: 0.2 MG/DL
BNP BLD-MCNC: 40.9 PG/ML
BUN SERPL-MCNC: 11.7 MG/DL (ref 7–18.7)
CALCIUM SERPL-MCNC: 9.4 MG/DL (ref 8.4–10.2)
CHLORIDE SERPL-SCNC: 108 MMOL/L (ref 98–107)
CO2 SERPL-SCNC: 23 MMOL/L (ref 22–29)
CREAT SERPL-MCNC: 0.87 MG/DL (ref 0.55–1.02)
EOSINOPHIL # BLD AUTO: 0.06 X10(3)/MCL (ref 0–0.9)
EOSINOPHIL NFR BLD AUTO: 0.7 %
ERYTHROCYTE [DISTWIDTH] IN BLOOD BY AUTOMATED COUNT: 18.2 % (ref 11.5–17)
GFR SERPLBLD CREATININE-BSD FMLA CKD-EPI: >60 MLS/MIN/1.73/M2
GLOBULIN SER-MCNC: 4 GM/DL (ref 2.4–3.5)
GLUCOSE SERPL-MCNC: 97 MG/DL (ref 74–100)
HCT VFR BLD AUTO: 36 % (ref 37–47)
HGB BLD-MCNC: 10.9 G/DL (ref 12–16)
IMM GRANULOCYTES # BLD AUTO: 0.02 X10(3)/MCL (ref 0–0.04)
IMM GRANULOCYTES NFR BLD AUTO: 0.2 %
INR BLD: 0.83 (ref 0–1.3)
LYMPHOCYTES # BLD AUTO: 2.2 X10(3)/MCL (ref 0.6–4.6)
LYMPHOCYTES NFR BLD AUTO: 26 %
MCH RBC QN AUTO: 23.6 PG (ref 27–31)
MCHC RBC AUTO-ENTMCNC: 30.3 G/DL (ref 33–36)
MCV RBC AUTO: 78.1 FL (ref 80–94)
MONOCYTES # BLD AUTO: 0.53 X10(3)/MCL (ref 0.1–1.3)
MONOCYTES NFR BLD AUTO: 6.3 %
NEUTROPHILS # BLD AUTO: 5.62 X10(3)/MCL (ref 2.1–9.2)
NEUTROPHILS NFR BLD AUTO: 66.3 %
NRBC BLD AUTO-RTO: 0 %
PLATELET # BLD AUTO: 397 X10(3)/MCL (ref 130–400)
PMV BLD AUTO: 9.3 FL (ref 7.4–10.4)
POTASSIUM SERPL-SCNC: 4.3 MMOL/L (ref 3.5–5.1)
PROT SERPL-MCNC: 7.7 GM/DL (ref 6.4–8.3)
PROTHROMBIN TIME: 11.4 SECONDS (ref 12.5–14.5)
RBC # BLD AUTO: 4.61 X10(6)/MCL (ref 4.2–5.4)
SODIUM SERPL-SCNC: 140 MMOL/L (ref 136–145)
TROPONIN I SERPL-MCNC: <0.01 NG/ML (ref 0–0.04)
WBC # SPEC AUTO: 8.47 X10(3)/MCL (ref 4.5–11.5)

## 2023-05-03 PROCEDURE — 85025 COMPLETE CBC W/AUTO DIFF WBC: CPT | Performed by: EMERGENCY MEDICINE

## 2023-05-03 PROCEDURE — 85610 PROTHROMBIN TIME: CPT | Performed by: EMERGENCY MEDICINE

## 2023-05-03 PROCEDURE — 99285 EMERGENCY DEPT VISIT HI MDM: CPT | Mod: 25

## 2023-05-03 PROCEDURE — 93010 EKG 12-LEAD: ICD-10-PCS | Mod: ,,, | Performed by: INTERNAL MEDICINE

## 2023-05-03 PROCEDURE — 93005 ELECTROCARDIOGRAM TRACING: CPT

## 2023-05-03 PROCEDURE — 80053 COMPREHEN METABOLIC PANEL: CPT | Performed by: EMERGENCY MEDICINE

## 2023-05-03 PROCEDURE — 93010 ELECTROCARDIOGRAM REPORT: CPT | Mod: ,,, | Performed by: INTERNAL MEDICINE

## 2023-05-03 PROCEDURE — 83880 ASSAY OF NATRIURETIC PEPTIDE: CPT | Performed by: EMERGENCY MEDICINE

## 2023-05-03 PROCEDURE — 84484 ASSAY OF TROPONIN QUANT: CPT | Performed by: EMERGENCY MEDICINE

## 2023-05-03 RX ORDER — MELOXICAM 15 MG/1
15 TABLET ORAL DAILY
Qty: 7 TABLET | Refills: 0 | Status: SHIPPED | OUTPATIENT
Start: 2023-05-03 | End: 2023-05-10

## 2023-05-03 RX ORDER — CYCLOBENZAPRINE HCL 10 MG
10 TABLET ORAL 3 TIMES DAILY PRN
Qty: 15 TABLET | Refills: 0 | Status: SHIPPED | OUTPATIENT
Start: 2023-05-03 | End: 2023-05-10

## 2023-05-03 NOTE — ED PROVIDER NOTES
Encounter Date: 5/3/2023       History     Chief Complaint   Patient presents with    Chest Pain     Pt to ed c cp left upper chest pain described as pressure and radiates to back. Had same pain on Saturday that resolved with motrin- returned tonight @2300. Denies Cardiac Hx. States SOB.      42 F  denies any past medical history but is followed by a PCP Dr. Hernández.  States 4 days ago she started getting pain just adjacent to the sternum on the left side.  States it feels like a tight pain when it occurs.  She states it started while she was at work moving boxes.  Takes some ibuprofen which helped but then it recurred today just prior to arrival.  It is worse with movements such as sitting upright or turning her torso and with deep breathing.  She does not feel short of breath she does not have any nausea or vomiting she is not been coughing running fever.  She is no personal history of coronary artery disease nor a family history of early coronary artery disease      Review of patient's allergies indicates:  No Known Allergies  Past Medical History:   Diagnosis Date    Thyroid disease      Past Surgical History:   Procedure Laterality Date     SECTION       Family History   Problem Relation Age of Onset    No Known Problems Mother     No Known Problems Father     No Known Problems Sister     No Known Problems Brother      Social History     Tobacco Use    Smoking status: Never    Smokeless tobacco: Never   Substance Use Topics    Alcohol use: Not Currently    Drug use: Never     Review of Systems   Constitutional:  Negative for chills and fever.   Respiratory:  Negative for cough, chest tightness and shortness of breath.    Cardiovascular:  Positive for chest pain.   Gastrointestinal:  Negative for abdominal pain, nausea and vomiting.   Musculoskeletal:  Negative for myalgias and neck pain.   Neurological:  Negative for syncope.   All other systems reviewed and are negative.    Physical Exam     Initial  Vitals [05/03/23 0108]   BP Pulse Resp Temp SpO2   (!) 150/80 88 16 98.1 °F (36.7 °C) 100 %      MAP       --         Physical Exam    Nursing note and vitals reviewed.  Constitutional: She appears well-developed and well-nourished. No distress.   HENT:   Head: Normocephalic and atraumatic.   Eyes: Conjunctivae are normal.   Cardiovascular:  Normal rate and intact distal pulses.           Pulmonary/Chest: No respiratory distress. She has no rhonchi.   Tender palpation left-sided sternum.  Reproduced with sitting upright and turning the torso   Abdominal: Abdomen is soft. Bowel sounds are normal. There is no abdominal tenderness. There is no rebound and no guarding.   Musculoskeletal:         General: No edema.     Neurological: She is alert and oriented to person, place, and time. She has normal strength.   Skin: Skin is warm and dry.   Psychiatric: She has a normal mood and affect.       ED Course   Procedures  Labs Reviewed   COMPREHENSIVE METABOLIC PANEL - Abnormal; Notable for the following components:       Result Value    Chloride 108 (*)     Globulin 4.0 (*)     Albumin/Globulin Ratio 0.9 (*)     All other components within normal limits   PROTIME-INR - Abnormal; Notable for the following components:    PT 11.4 (*)     All other components within normal limits   CBC WITH DIFFERENTIAL - Abnormal; Notable for the following components:    Hgb 10.9 (*)     Hct 36.0 (*)     MCV 78.1 (*)     MCH 23.6 (*)     MCHC 30.3 (*)     RDW 18.2 (*)     All other components within normal limits   B-TYPE NATRIURETIC PEPTIDE - Normal   TROPONIN I - Normal   CBC W/ AUTO DIFFERENTIAL    Narrative:     The following orders were created for panel order CBC auto differential.  Procedure                               Abnormality         Status                     ---------                               -----------         ------                     CBC with Differential[[id number]]        Abnormal            Final result                  Please view results for these tests on the individual orders.        ECG Results              EKG 12-lead (Preliminary result)  Result time 05/03/23 06:26:20      Wet Read by Fracisco Booth MD (05/03/23 06:26:20, [facility] - Emergency Dept, Emergency Medicine)    EKG at 1:05 a.m..  80 per normal sinus rhythm LVH no ST segment elevations or depressions                                  Imaging Results              X-Ray Chest PA And Lateral (Final result)  Result time 05/03/23 06:13:32      Final result by José Miguel Barone MD (05/03/23 06:13:32)                   Impression:      No acute cardiopulmonary abnormality.      Electronically signed by: José Miguel Barone  Date:    05/03/2023  Time:    06:13               Narrative:    EXAMINATION:  XR CHEST PA AND LATERAL    CLINICAL HISTORY:  Chest Pain;    COMPARISON:  No priors    FINDINGS:  PA and lateral views of the chest were obtained. Heart and mediastinum within normal limits. The lungs are clear. No pneumothorax or significant effusion.                        Wet Read by Fracisco Booth MD (05/03/23 06:05:27, [facility] - Emergency Dept, Emergency Medicine)    No acute fractures or dislocation or free air or infiltrate                                       Medications - No data to display                    Medical Decision Making  DX: Chest pain chest wall pain musculoskeletal pain costochondritis mi pneumonia    Symptoms do not sound like ACS.  Easily reproducible consistent with costochondritis musculoskeletal chest pain.  Discussed this with her.  Discussed treatment plan.  No lifting more than 5 lb until cleared by PCP.  Will write a few days off work.    Problems Addressed:  Chest pain: acute illness or injury that poses a threat to life or bodily functions  Costochondral chest pain: self-limited or minor problem    Amount and/or Complexity of Data Reviewed  Labs: ordered. Decision-making details documented in ED  Course.  Radiology: ordered and independent interpretation performed. Decision-making details documented in ED Course.  ECG/medicine tests: ordered and independent interpretation performed. Decision-making details documented in ED Course.             Clinical Impression:   Final diagnoses:  [R07.9] Chest pain (Primary)  [R07.89] Chest wall pain  [R07.89] Costochondral chest pain        ED Disposition Condition    Discharge Stable          ED Prescriptions       Medication Sig Dispense Start Date End Date Auth. Provider    meloxicam (MOBIC) 15 MG tablet Take 1 tablet (15 mg total) by mouth once daily. for 7 days 7 tablet 5/3/2023 5/10/2023 Fracisco Booth MD    cyclobenzaprine (FLEXERIL) 10 MG tablet Take 1 tablet (10 mg total) by mouth 3 (three) times daily as needed for Muscle spasms. 15 tablet 5/3/2023 5/10/2023 Fracisco Booth MD          Follow-up Information       Follow up With Specialties Details Why Contact Info    Will Hernández MD Internal Medicine Schedule an appointment as soon as possible for a visit   [address]  [facility] [address]  [phone]               Fracisco Booth MD  05/03/23 0649

## 2023-05-03 NOTE — Clinical Note
"Rosa Mckeon" Rosmery was seen and treated in our emergency department on 5/3/2023.  She may return to work on 05/06/2023.  No lifting more than 5 lb until cleared by your primary care provider     If you have any questions or concerns, please don't hesitate to call.      Fracisco Booth MD"

## 2023-05-19 ENCOUNTER — LAB VISIT (OUTPATIENT)
Dept: LAB | Facility: HOSPITAL | Age: 43
End: 2023-05-19
Attending: INTERNAL MEDICINE
Payer: COMMERCIAL

## 2023-05-19 DIAGNOSIS — E03.9 MYXEDEMA HEART DISEASE: Primary | ICD-10-CM

## 2023-05-19 DIAGNOSIS — I51.9 MYXEDEMA HEART DISEASE: Primary | ICD-10-CM

## 2023-05-19 LAB
T4 FREE SERPL-MCNC: 0.99 NG/DL (ref 0.7–1.48)
TSH SERPL-ACNC: 4.57 UIU/ML (ref 0.35–4.94)

## 2023-05-19 PROCEDURE — 84439 ASSAY OF FREE THYROXINE: CPT

## 2023-05-19 PROCEDURE — 84443 ASSAY THYROID STIM HORMONE: CPT

## 2023-05-19 PROCEDURE — 36415 COLL VENOUS BLD VENIPUNCTURE: CPT

## 2023-08-18 ENCOUNTER — APPOINTMENT (OUTPATIENT)
Dept: LAB | Facility: HOSPITAL | Age: 43
End: 2023-08-18
Attending: INTERNAL MEDICINE
Payer: MEDICAID

## 2023-08-18 DIAGNOSIS — E03.9 MYXEDEMA HEART DISEASE: Primary | ICD-10-CM

## 2023-08-18 DIAGNOSIS — E03.9 PRIMARY HYPOTHYROIDISM: ICD-10-CM

## 2023-08-18 DIAGNOSIS — I51.9 MYXEDEMA HEART DISEASE: Primary | ICD-10-CM

## 2023-08-18 LAB
T4 FREE SERPL-MCNC: 1.1 NG/DL (ref 0.7–1.48)
TSH SERPL-ACNC: 0.19 UIU/ML (ref 0.35–4.94)

## 2023-08-18 PROCEDURE — 84443 ASSAY THYROID STIM HORMONE: CPT

## 2023-08-18 PROCEDURE — 84480 ASSAY TRIIODOTHYRONINE (T3): CPT

## 2023-08-18 PROCEDURE — 36415 COLL VENOUS BLD VENIPUNCTURE: CPT

## 2023-08-18 PROCEDURE — 84439 ASSAY OF FREE THYROXINE: CPT

## 2023-08-21 LAB — T3 SERPL IA-MCNC: 118 NG/DL (ref 80–200)

## 2023-10-17 ENCOUNTER — HOSPITAL ENCOUNTER (OUTPATIENT)
Dept: RADIOLOGY | Facility: CLINIC | Age: 43
Discharge: HOME | End: 2023-10-17
Attending: ORTHOPAEDIC SURGERY
Payer: COMMERCIAL

## 2023-10-17 ENCOUNTER — OFFICE VISIT (OUTPATIENT)
Dept: ORTHOPEDICS | Facility: CLINIC | Age: 43
End: 2023-10-17
Payer: COMMERCIAL

## 2023-10-17 VITALS
WEIGHT: 293 LBS | HEART RATE: 73 BPM | BODY MASS INDEX: 48.82 KG/M2 | HEIGHT: 65 IN | SYSTOLIC BLOOD PRESSURE: 133 MMHG | DIASTOLIC BLOOD PRESSURE: 85 MMHG

## 2023-10-17 DIAGNOSIS — M77.8 LEFT WRIST TENDINITIS: ICD-10-CM

## 2023-10-17 DIAGNOSIS — M25.532 LEFT WRIST PAIN: ICD-10-CM

## 2023-10-17 DIAGNOSIS — E66.01 MORBID OBESITY: ICD-10-CM

## 2023-10-17 DIAGNOSIS — M17.0 PRIMARY OSTEOARTHRITIS OF BOTH KNEES: Primary | ICD-10-CM

## 2023-10-17 PROCEDURE — 3008F BODY MASS INDEX DOCD: CPT | Mod: CPTII,,, | Performed by: ORTHOPAEDIC SURGERY

## 2023-10-17 PROCEDURE — 3075F PR MOST RECENT SYSTOLIC BLOOD PRESS GE 130-139MM HG: ICD-10-PCS | Mod: CPTII,,, | Performed by: ORTHOPAEDIC SURGERY

## 2023-10-17 PROCEDURE — 99214 OFFICE O/P EST MOD 30 MIN: CPT | Mod: ,,, | Performed by: ORTHOPAEDIC SURGERY

## 2023-10-17 PROCEDURE — 99214 PR OFFICE/OUTPT VISIT, EST, LEVL IV, 30-39 MIN: ICD-10-PCS | Mod: ,,, | Performed by: ORTHOPAEDIC SURGERY

## 2023-10-17 PROCEDURE — 1160F RVW MEDS BY RX/DR IN RCRD: CPT | Mod: CPTII,,, | Performed by: ORTHOPAEDIC SURGERY

## 2023-10-17 PROCEDURE — 3008F PR BODY MASS INDEX (BMI) DOCUMENTED: ICD-10-PCS | Mod: CPTII,,, | Performed by: ORTHOPAEDIC SURGERY

## 2023-10-17 PROCEDURE — 1159F MED LIST DOCD IN RCRD: CPT | Mod: CPTII,,, | Performed by: ORTHOPAEDIC SURGERY

## 2023-10-17 PROCEDURE — 1160F PR REVIEW ALL MEDS BY PRESCRIBER/CLIN PHARMACIST DOCUMENTED: ICD-10-PCS | Mod: CPTII,,, | Performed by: ORTHOPAEDIC SURGERY

## 2023-10-17 PROCEDURE — 4010F PR ACE/ARB THEARPY RXD/TAKEN: ICD-10-PCS | Mod: CPTII,,, | Performed by: ORTHOPAEDIC SURGERY

## 2023-10-17 PROCEDURE — 3079F PR MOST RECENT DIASTOLIC BLOOD PRESSURE 80-89 MM HG: ICD-10-PCS | Mod: CPTII,,, | Performed by: ORTHOPAEDIC SURGERY

## 2023-10-17 PROCEDURE — 73110 XR WRIST COMPLETE 3 VIEWS LEFT: ICD-10-PCS | Mod: LT,,, | Performed by: ORTHOPAEDIC SURGERY

## 2023-10-17 PROCEDURE — 4010F ACE/ARB THERAPY RXD/TAKEN: CPT | Mod: CPTII,,, | Performed by: ORTHOPAEDIC SURGERY

## 2023-10-17 PROCEDURE — 73110 X-RAY EXAM OF WRIST: CPT | Mod: LT,,, | Performed by: ORTHOPAEDIC SURGERY

## 2023-10-17 PROCEDURE — 3079F DIAST BP 80-89 MM HG: CPT | Mod: CPTII,,, | Performed by: ORTHOPAEDIC SURGERY

## 2023-10-17 PROCEDURE — 3075F SYST BP GE 130 - 139MM HG: CPT | Mod: CPTII,,, | Performed by: ORTHOPAEDIC SURGERY

## 2023-10-17 PROCEDURE — 1159F PR MEDICATION LIST DOCUMENTED IN MEDICAL RECORD: ICD-10-PCS | Mod: CPTII,,, | Performed by: ORTHOPAEDIC SURGERY

## 2023-10-17 RX ORDER — DICLOFENAC SODIUM 75 MG/1
75 TABLET, DELAYED RELEASE ORAL 2 TIMES DAILY PRN
Qty: 60 TABLET | Refills: 0 | Status: SHIPPED | OUTPATIENT
Start: 2023-10-17 | End: 2024-03-21

## 2023-10-17 RX ORDER — GABAPENTIN 600 MG/1
600 TABLET ORAL NIGHTLY
Qty: 40 TABLET | Refills: 0 | Status: SHIPPED | OUTPATIENT
Start: 2023-10-17

## 2023-10-17 NOTE — PROGRESS NOTES
[facility]opaedic Clinic  Orthopedic Clinic Note      Chief Complaint:   Chief Complaint   Patient presents with    Wrist Pain     Left wrist pain. No prior sx. Xr done today. States she noticed pain 2022. Felt pain here and there and states 2mo ago pain became more frequent. Now she has trouble picking things up with some numbness and tingling.      Referring Physician: No ref. provider found      History of Present Illness:    This is a 42 y.o. year old female presenting with some left wrist pain/numbness. Has been noticing it since about Oct 2022. Does not remember an exact injury. Has trouble sometimes with picking things up. Has been wearing a night brace that has helped.  She has a diagnosis of bilateral knee osteoarthritis but she is doing well with that.      Past Medical History:   Diagnosis Date    Thyroid disease        Past Surgical History:   Procedure Laterality Date     SECTION         Current Outpatient Medications   Medication Sig    levothyroxine (SYNTHROID) 125 MCG tablet Take 125 mcg by mouth.    lisinopriL (PRINIVIL,ZESTRIL) 5 MG tablet Take 5 mg by mouth.    norethindrone (MICRONOR) 0.35 mg tablet Take 1 tablet by mouth.    progesterone (PROMETRIUM) 200 MG capsule INSERT 1 CAPSULE IN THE VAGINA EVERY NIGHT AT BEDTIME AS DIRECTED     No current facility-administered medications for this visit.       Review of patient's allergies indicates:  No Known Allergies    Family History   Problem Relation Age of Onset    No Known Problems Mother     No Known Problems Father     No Known Problems Sister     No Known Problems Brother        Social History     Socioeconomic History    Marital status: Single   Tobacco Use    Smoking status: Never    Smokeless tobacco: Never   Substance and Sexual Activity    Alcohol use: Not Currently    Drug use: Never    Sexual activity: Not Currently           Review of Systems:  All review of systems negative except for those stated in the  "HPI.    Examination:    Vital Signs:    Vitals:    10/17/23 1408   BP: 133/85   Pulse: 73   Weight: (!) 137.4 kg (303 lb)   Height: 5' 5" (1.651 m)       Body mass index is 50.42 kg/m².    Physical Examination:  General: Well-developed, well-nourished.  Neuro: Alert and oriented x 3.  Psych: Normal mood and affect.  Card: Regular rate and rhythm.  Resp: Unlabored and quiet.  Wrist Exam:  No obvious deformity. Negative tenderness over distal radius. Supination and pronation to 90 degrees and 90 degrees, respectively. Wrist flexion to 90 degrees and wrist extension to 70 degree. Negative flexion-compression test and Phanel´s test. Negative Finkelstein´s test. 5/5 strength, normal skin appearance and palpable pulses and CR<2.      Imaging: X-rays ordered and images interpreted today personally by me of 3 views of left wrist show no acute osseous pathology     Assessment: Left wrist pain  -     X-Ray Wrist Complete Left; Future; Expected date: 10/17/2023    Primary osteoarthritis of both knees    Morbid obesity        Plan: I'm going to prescribe this patient an anti inflammatory medicine and gabapentin to help with pain. I will see her back in 6 weeks.  She will continue with her cock-up wrist splint.           Kan Chiu MD personally performed the services described in this documentation, including but not limited to patient's history, physical examination, and assessment and plan of care. All medical record entries made by Lucy Fry ATC, OTC were performed at his direction and in his presence. The medical record was reviewed and is accurate and complete.       No follow-ups on file.      DISCLAIMER: This note may have been dictated using voice recognition software and may contain grammatical errors.     NOTE: Consult report sent to referring provider via [employer] EMR.    "

## 2023-11-21 ENCOUNTER — TELEPHONE (OUTPATIENT)
Dept: ORTHOPEDICS | Facility: CLINIC | Age: 43
End: 2023-11-21
Payer: COMMERCIAL

## 2024-02-06 NOTE — PROGRESS NOTES
[facility]  Orthopedic Clinic Note      Chief Complaint:   Chief Complaint   Patient presents with    Knee Pain     Bilateral knee pain. Xr done today. States her knees have been bothering her and she is trying to be more active as of late. She is requesting synvisc injection authorization.      Referring Physician: No ref. provider found      History of Present Illness:    This is a 43 y.o. year old female presenting with some left wrist pain/numbness. Has been noticing it since about Oct 2022. Does not remember an exact injury. Has trouble sometimes with picking things up. Has been wearing a night brace that has helped.  She has a diagnosis of bilateral knee osteoarthritis but she is doing well with that.  2024 patient presents for follow-up on bilateral knee pain.  She received bilateral knee viscosupplementation injections at her prior visit.  They were effective for approximately a year.  She reports that her symptoms have returned and are inhibiting her ability to be more active lately.  She would like to repeat viscosupplementation injections.      Past Medical History:   Diagnosis Date    Thyroid disease        Past Surgical History:   Procedure Laterality Date     SECTION         Current Outpatient Medications   Medication Sig    diclofenac (VOLTAREN) 75 MG EC tablet Take 1 tablet (75 mg total) by mouth 2 (two) times daily as needed. Take with food    gabapentin (NEURONTIN) 600 MG tablet Take 1 tablet (600 mg total) by mouth nightly.    levothyroxine (SYNTHROID) 125 MCG tablet Take 125 mcg by mouth.    lisinopriL (PRINIVIL,ZESTRIL) 5 MG tablet Take 5 mg by mouth.    norethindrone (MICRONOR) 0.35 mg tablet Take 1 tablet by mouth.    progesterone (PROMETRIUM) 200 MG capsule INSERT 1 CAPSULE IN THE VAGINA EVERY NIGHT AT BEDTIME AS DIRECTED     No current facility-administered medications for this visit.       Review of patient's allergies indicates:  No Known Allergies    Family  "History   Problem Relation Age of Onset    No Known Problems Mother     No Known Problems Father     No Known Problems Sister     No Known Problems Brother        Social History     Socioeconomic History    Marital status: Single   Tobacco Use    Smoking status: Never    Smokeless tobacco: Never   Substance and Sexual Activity    Alcohol use: Not Currently    Drug use: Never    Sexual activity: Not Currently           Review of Systems:  All review of systems negative except for those stated in the HPI.    Examination:    Vital Signs:    Vitals:    02/08/24 0833   Weight: 134.7 kg (297 lb)   Height: 5' 5" (1.651 m)       Body mass index is 49.42 kg/m².    Physical Examination:  General: Well-developed, well-nourished.  Neuro: Alert and oriented x 3.  Psych: Normal mood and affect.  Card: Regular rate and rhythm.  Resp: Unlabored and quiet.  Bilateral knee Exam:  Varus deformity. Range of motion from 0-130 degrees. Negative patella grind and equal subluxation of knee cap medial and lateral < 1cm. Negative patella tendon tenderness. Negative Lachman and anterior drawer test. Negative posterior drawer test. Negative varus and valgus stress test. Positive medial joint line tenderness. Negative lateral joint line tenderness. 4/5 strength and normal skin appearance. Sensibility normal.       Imaging: X-rays ordered and images interpreted today personally by me of 4 views of the bilateral knees demonstrate degenerative changes with joint space narrowing of the medial aspect of the tibiofemoral compartment.  Degenerative changes are most notable in the patellofemoral compartment with significant cartilage loss.      Assessment: Primary osteoarthritis of both knees  -     X-Ray Knee Complete 4 Or More Views Bilat; Future; Expected date: 02/08/2024    Morbid obesity    Left wrist tendinitis        Plan:  X-rays were reviewed with the patient.  She will continue over-the-counter medications as needed for pain.  Plan to obtain " pre authorization for repeat bilateral knee viscosupplementation injections.  She will return to clinic for these injections once approval is obtained.  She verbalized understanding of the plan of care with no further questions.    Kan Chiu MD personally performed the services described in this documentation, including but not limited to patient's history, physical examination, and assessment and plan of care. All medical record entries made by CLARISSA Jason were performed at his direction and in his presence. The medical record was reviewed and is accurate and complete.         Follow up for Bilateral knee viscosupplementation injections.      DISCLAIMER: This note may have been dictated using voice recognition software and may contain grammatical errors.     NOTE: Consult report sent to referring provider via [employer] EMR.

## 2024-02-08 ENCOUNTER — OFFICE VISIT (OUTPATIENT)
Dept: ORTHOPEDICS | Facility: CLINIC | Age: 44
End: 2024-02-08
Payer: COMMERCIAL

## 2024-02-08 ENCOUNTER — HOSPITAL ENCOUNTER (OUTPATIENT)
Dept: RADIOLOGY | Facility: CLINIC | Age: 44
Discharge: HOME | End: 2024-02-08
Attending: ORTHOPAEDIC SURGERY
Payer: COMMERCIAL

## 2024-02-08 VITALS — HEIGHT: 65 IN | BODY MASS INDEX: 48.82 KG/M2 | WEIGHT: 293 LBS

## 2024-02-08 DIAGNOSIS — E66.01 MORBID OBESITY: ICD-10-CM

## 2024-02-08 DIAGNOSIS — M17.0 PRIMARY OSTEOARTHRITIS OF BOTH KNEES: Primary | ICD-10-CM

## 2024-02-08 DIAGNOSIS — M77.8 LEFT WRIST TENDINITIS: ICD-10-CM

## 2024-02-08 DIAGNOSIS — M17.0 PRIMARY OSTEOARTHRITIS OF BOTH KNEES: ICD-10-CM

## 2024-02-08 PROCEDURE — 73564 X-RAY EXAM KNEE 4 OR MORE: CPT | Mod: 50,,, | Performed by: ORTHOPAEDIC SURGERY

## 2024-02-08 PROCEDURE — 3008F BODY MASS INDEX DOCD: CPT | Mod: CPTII,,, | Performed by: ORTHOPAEDIC SURGERY

## 2024-02-08 PROCEDURE — 1160F RVW MEDS BY RX/DR IN RCRD: CPT | Mod: CPTII,,, | Performed by: ORTHOPAEDIC SURGERY

## 2024-02-08 PROCEDURE — 1159F MED LIST DOCD IN RCRD: CPT | Mod: CPTII,,, | Performed by: ORTHOPAEDIC SURGERY

## 2024-02-08 PROCEDURE — 99213 OFFICE O/P EST LOW 20 MIN: CPT | Mod: ,,, | Performed by: ORTHOPAEDIC SURGERY

## 2024-02-28 ENCOUNTER — TELEPHONE (OUTPATIENT)
Dept: ORTHOPEDICS | Facility: CLINIC | Age: 44
End: 2024-02-28
Payer: COMMERCIAL

## 2024-02-28 DIAGNOSIS — M17.0 PRIMARY OSTEOARTHRITIS OF BOTH KNEES: Primary | ICD-10-CM

## 2024-02-28 RX ORDER — HYALURONATE SODIUM, STABILIZED 60 MG/3 ML
3 SYRINGE (ML) INTRAARTICULAR ONCE
Qty: 6 ML | Refills: 0 | Status: SHIPPED | OUTPATIENT
Start: 2024-02-28 | End: 2024-02-28

## 2024-03-19 ENCOUNTER — LAB VISIT (OUTPATIENT)
Dept: LAB | Facility: HOSPITAL | Age: 44
End: 2024-03-19
Attending: INTERNAL MEDICINE
Payer: COMMERCIAL

## 2024-03-19 DIAGNOSIS — M81.0 SENILE OSTEOPOROSIS: ICD-10-CM

## 2024-03-19 DIAGNOSIS — E11.9 DIABETES MELLITUS WITHOUT COMPLICATION: ICD-10-CM

## 2024-03-19 DIAGNOSIS — R94.8 NONSPECIFIC ABNORMAL RESULTS OF BASAL METABOLISM FUNCTION STUDY: Primary | ICD-10-CM

## 2024-03-19 DIAGNOSIS — Z00.00 ROUTINE GENERAL MEDICAL EXAMINATION AT A HEALTH CARE FACILITY: ICD-10-CM

## 2024-03-19 DIAGNOSIS — E55.9 AVITAMINOSIS D: ICD-10-CM

## 2024-03-19 LAB
ALBUMIN SERPL-MCNC: 3.3 G/DL (ref 3.5–5)
ALBUMIN/GLOB SERPL: 0.8 RATIO (ref 1.1–2)
ALP SERPL-CCNC: 69 UNIT/L (ref 40–150)
ALT SERPL-CCNC: 22 UNIT/L (ref 0–55)
APPEARANCE UR: CLEAR
AST SERPL-CCNC: 13 UNIT/L (ref 5–34)
BACTERIA #/AREA URNS AUTO: ABNORMAL /HPF
BILIRUB SERPL-MCNC: 0.3 MG/DL
BILIRUB UR QL STRIP.AUTO: NEGATIVE
BUN SERPL-MCNC: 9.3 MG/DL (ref 7–18.7)
CALCIUM SERPL-MCNC: 9.2 MG/DL (ref 8.4–10.2)
CHLORIDE SERPL-SCNC: 108 MMOL/L (ref 98–107)
CHOLEST SERPL-MCNC: 206 MG/DL
CHOLEST/HDLC SERPL: 3 {RATIO} (ref 0–5)
CO2 SERPL-SCNC: 22 MMOL/L (ref 22–29)
COLOR UR AUTO: ABNORMAL
CREAT SERPL-MCNC: 0.79 MG/DL (ref 0.55–1.02)
DEPRECATED CALCIDIOL+CALCIFEROL SERPL-MC: 10.1 NG/ML (ref 30–80)
ERYTHROCYTE [DISTWIDTH] IN BLOOD BY AUTOMATED COUNT: 16.8 % (ref 11.5–17)
ERYTHROCYTE [SEDIMENTATION RATE] IN BLOOD: 58 MM/HR (ref 0–20)
EST. AVERAGE GLUCOSE BLD GHB EST-MCNC: 114 MG/DL
GFR SERPLBLD CREATININE-BSD FMLA CKD-EPI: >60 MLS/MIN/1.73/M2
GLOBULIN SER-MCNC: 3.9 GM/DL (ref 2.4–3.5)
GLUCOSE SERPL-MCNC: 101 MG/DL (ref 74–100)
GLUCOSE UR QL STRIP.AUTO: NORMAL
HBA1C MFR BLD: 5.6 %
HCT VFR BLD AUTO: 39.2 % (ref 37–47)
HDLC SERPL-MCNC: 62 MG/DL (ref 35–60)
HGB BLD-MCNC: 11.9 G/DL (ref 12–16)
KETONES UR QL STRIP.AUTO: NEGATIVE
LDLC SERPL CALC-MCNC: 125 MG/DL (ref 50–140)
LEUKOCYTE ESTERASE UR QL STRIP.AUTO: NEGATIVE
MCH RBC QN AUTO: 24.9 PG (ref 27–31)
MCHC RBC AUTO-ENTMCNC: 30.4 G/DL (ref 33–36)
MCV RBC AUTO: 82.2 FL (ref 80–94)
MUCOUS THREADS URNS QL MICRO: ABNORMAL /LPF
NITRITE UR QL STRIP.AUTO: NEGATIVE
NRBC BLD AUTO-RTO: 0 %
PH UR STRIP.AUTO: 5.5 [PH]
PLATELET # BLD AUTO: 372 X10(3)/MCL (ref 130–400)
PMV BLD AUTO: 10.1 FL (ref 7.4–10.4)
POTASSIUM SERPL-SCNC: 4.7 MMOL/L (ref 3.5–5.1)
PROT SERPL-MCNC: 7.2 GM/DL (ref 6.4–8.3)
PROT UR QL STRIP.AUTO: NEGATIVE
RBC # BLD AUTO: 4.77 X10(6)/MCL (ref 4.2–5.4)
RBC #/AREA URNS AUTO: ABNORMAL /HPF
RBC UR QL AUTO: NEGATIVE
RHEUMATOID FACT SERPL-ACNC: <13 IU/ML
SODIUM SERPL-SCNC: 138 MMOL/L (ref 136–145)
SP GR UR STRIP.AUTO: 1.02 (ref 1–1.03)
SQUAMOUS #/AREA URNS LPF: ABNORMAL /HPF
T4 FREE SERPL-MCNC: 0.96 NG/DL (ref 0.7–1.48)
TRIGL SERPL-MCNC: 93 MG/DL (ref 37–140)
TSH SERPL-ACNC: 2.74 UIU/ML (ref 0.35–4.94)
URATE SERPL-MCNC: 5.7 MG/DL (ref 2.6–6)
UROBILINOGEN UR STRIP-ACNC: NORMAL
VLDLC SERPL CALC-MCNC: 19 MG/DL
WBC # SPEC AUTO: 7.63 X10(3)/MCL (ref 4.5–11.5)
WBC #/AREA URNS AUTO: ABNORMAL /HPF

## 2024-03-19 PROCEDURE — 80053 COMPREHEN METABOLIC PANEL: CPT

## 2024-03-19 PROCEDURE — 80061 LIPID PANEL: CPT

## 2024-03-19 PROCEDURE — 84550 ASSAY OF BLOOD/URIC ACID: CPT

## 2024-03-19 PROCEDURE — 81001 URINALYSIS AUTO W/SCOPE: CPT

## 2024-03-19 PROCEDURE — 84439 ASSAY OF FREE THYROXINE: CPT

## 2024-03-19 PROCEDURE — 83036 HEMOGLOBIN GLYCOSYLATED A1C: CPT

## 2024-03-19 PROCEDURE — 85652 RBC SED RATE AUTOMATED: CPT

## 2024-03-19 PROCEDURE — 84443 ASSAY THYROID STIM HORMONE: CPT

## 2024-03-19 PROCEDURE — 85027 COMPLETE CBC AUTOMATED: CPT

## 2024-03-19 PROCEDURE — 86431 RHEUMATOID FACTOR QUANT: CPT | Mod: 91

## 2024-03-19 PROCEDURE — 36415 COLL VENOUS BLD VENIPUNCTURE: CPT

## 2024-03-19 PROCEDURE — 86431 RHEUMATOID FACTOR QUANT: CPT

## 2024-03-19 PROCEDURE — 82306 VITAMIN D 25 HYDROXY: CPT

## 2024-03-19 PROCEDURE — 86225 DNA ANTIBODY NATIVE: CPT

## 2024-03-20 LAB
ANTINUCLEAR ANTIBODY SCREEN (OHS): NEGATIVE
DSDNA AB QUANT (OHS): <0.6 IU/ML
RHEUMATOID FACTOR IGA QUANTITATIVE (OLG): 1.8 IU/ML
RHEUMATOID FACTOR IGM QUANTITATIVE (OLG): 0.8 IU/ML

## 2024-03-21 ENCOUNTER — OFFICE VISIT (OUTPATIENT)
Dept: ORTHOPEDICS | Facility: CLINIC | Age: 44
End: 2024-03-21
Payer: COMMERCIAL

## 2024-03-21 VITALS
HEIGHT: 65 IN | BODY MASS INDEX: 48.82 KG/M2 | TEMPERATURE: 98 F | HEART RATE: 72 BPM | DIASTOLIC BLOOD PRESSURE: 76 MMHG | WEIGHT: 293 LBS | SYSTOLIC BLOOD PRESSURE: 117 MMHG

## 2024-03-21 DIAGNOSIS — E66.01 MORBID OBESITY: ICD-10-CM

## 2024-03-21 DIAGNOSIS — M17.0 PRIMARY OSTEOARTHRITIS OF BOTH KNEES: Primary | ICD-10-CM

## 2024-03-21 PROCEDURE — 3008F BODY MASS INDEX DOCD: CPT | Mod: CPTII,,, | Performed by: ORTHOPAEDIC SURGERY

## 2024-03-21 PROCEDURE — 20610 DRAIN/INJ JOINT/BURSA W/O US: CPT | Mod: 50,,, | Performed by: ORTHOPAEDIC SURGERY

## 2024-03-21 PROCEDURE — 1160F RVW MEDS BY RX/DR IN RCRD: CPT | Mod: CPTII,,, | Performed by: ORTHOPAEDIC SURGERY

## 2024-03-21 PROCEDURE — 99214 OFFICE O/P EST MOD 30 MIN: CPT | Mod: 25,,, | Performed by: ORTHOPAEDIC SURGERY

## 2024-03-21 PROCEDURE — 3044F HG A1C LEVEL LT 7.0%: CPT | Mod: CPTII,,, | Performed by: ORTHOPAEDIC SURGERY

## 2024-03-21 PROCEDURE — 3074F SYST BP LT 130 MM HG: CPT | Mod: CPTII,,, | Performed by: ORTHOPAEDIC SURGERY

## 2024-03-21 PROCEDURE — 3078F DIAST BP <80 MM HG: CPT | Mod: CPTII,,, | Performed by: ORTHOPAEDIC SURGERY

## 2024-03-21 PROCEDURE — 1159F MED LIST DOCD IN RCRD: CPT | Mod: CPTII,,, | Performed by: ORTHOPAEDIC SURGERY

## 2024-03-21 RX ORDER — LEVOTHYROXINE SODIUM 175 UG/1
175 TABLET ORAL DAILY
COMMUNITY
Start: 2024-02-20

## 2024-03-21 RX ORDER — LIDOCAINE HYDROCHLORIDE 20 MG/ML
2 INJECTION, SOLUTION INFILTRATION; PERINEURAL
Status: DISCONTINUED | OUTPATIENT
Start: 2024-03-21 | End: 2024-03-21 | Stop reason: HOSPADM

## 2024-03-21 RX ORDER — DICLOFENAC SODIUM 50 MG/1
50 TABLET, DELAYED RELEASE ORAL 2 TIMES DAILY
COMMUNITY
Start: 2024-03-19

## 2024-03-21 RX ADMIN — LIDOCAINE HYDROCHLORIDE 2 MG: 20 INJECTION, SOLUTION INFILTRATION; PERINEURAL at 08:03

## 2024-03-21 NOTE — PROCEDURES
Large Joint Aspiration/Injection: bilateral knee    Date/Time: 3/21/2024 8:30 AM    Performed by: Kan Chiu MD  Authorized by: Kan Chiu MD    Consent Done?:  Yes (Verbal)  Indications:  Pain  Site marked: the procedure site was marked    Timeout: prior to procedure the correct patient, procedure, and site was verified    Prep: patient was prepped and draped in usual sterile fashion      Local anesthesia used?: Yes    Local anesthetic:  Topical anesthetic and lidocaine 2% without epinephrine    Details:  Needle Size:  18 G  Ultrasonic Guidance for needle placement?: No    Approach:  Superior  Location:  Knee  Laterality:  Bilateral  Site:  Bilateral knee  Medications (Right):  2 mg LIDOcaine HCL 20 mg/ml (2%) 20 mg/mL (2 %)  Medications (Left):  2 mg LIDOcaine HCL 20 mg/ml (2%) 20 mg/mL (2 %)  Patient tolerance:  Patient tolerated the procedure well with no immediate complications

## 2024-03-21 NOTE — PROGRESS NOTES
[facility] Clinic  Orthopedic Clinic Note      Chief Complaint:   Chief Complaint   Patient presents with    Appointment     Patient here today for bilateral durolane injections.      Referring Physician: Kan Chiu MD      History of Present Illness:    This is a 43 y.o. year old female presenting with some left wrist pain/numbness. Has been noticing it since about Oct 2022. Does not remember an exact injury. Has trouble sometimes with picking things up. Has been wearing a night brace that has helped.  She has a diagnosis of bilateral knee osteoarthritis but she is doing well with that.  2024 patient presents for follow-up on bilateral knee pain.  She received bilateral knee viscosupplementation injections at her prior visit.  They were effective for approximately a year.  She reports that her symptoms have returned and are inhibiting her ability to be more active lately.  She would like to repeat viscosupplementation injections.  2024 this patient comes in for bilateral delayed injections today.  Her symptoms have not changed.      Past Medical History:   Diagnosis Date    Thyroid disease        Past Surgical History:   Procedure Laterality Date     SECTION         Current Outpatient Medications   Medication Sig    diclofenac (VOLTAREN) 50 MG EC tablet Take 50 mg by mouth 2 (two) times daily.    gabapentin (NEURONTIN) 600 MG tablet Take 1 tablet (600 mg total) by mouth nightly.    levothyroxine (SYNTHROID, LEVOTHROID) 175 MCG tablet Take 175 mcg by mouth once daily.    lisinopriL (PRINIVIL,ZESTRIL) 5 MG tablet Take 5 mg by mouth.    norethindrone (MICRONOR) 0.35 mg tablet Take 1 tablet by mouth.    progesterone (PROMETRIUM) 200 MG capsule INSERT 1 CAPSULE IN THE VAGINA EVERY NIGHT AT BEDTIME AS DIRECTED     No current facility-administered medications for this visit.       Review of patient's allergies indicates:  No Known Allergies    Family History   Problem Relation Age of Onset    No  "Known Problems Mother     No Known Problems Father     No Known Problems Sister     No Known Problems Brother        Social History     Socioeconomic History    Marital status: Single   Tobacco Use    Smoking status: Never    Smokeless tobacco: Never   Substance and Sexual Activity    Alcohol use: Not Currently    Drug use: Never    Sexual activity: Not Currently           Review of Systems:  All review of systems negative except for those stated in the HPI.    Examination:    Vital Signs:    Vitals:    03/21/24 0832   BP: 117/76   Pulse: 72   Temp: 98 °F (36.7 °C)   Weight: (!) 136.2 kg (300 lb 3.2 oz)   Height: 5' 5" (1.651 m)       Body mass index is 49.96 kg/m².    Physical Examination:  General: Well-developed, well-nourished.  Neuro: Alert and oriented x 3.  Psych: Normal mood and affect.  Card: Regular rate and rhythm.  Resp: Unlabored and quiet.  Bilateral knee Exam:  Varus deformity. Range of motion from 0-130 degrees. Negative patella grind and equal subluxation of knee cap medial and lateral < 1cm. Negative patella tendon tenderness. Negative Lachman and anterior drawer test. Negative posterior drawer test. Negative varus and valgus stress test. Positive medial joint line tenderness. Negative lateral joint line tenderness. 4/5 strength and normal skin appearance. Sensibility normal.       Imaging: X-rays ordered and images interpreted today personally by me of 4 views of the bilateral knees demonstrate degenerative changes with joint space narrowing of the medial aspect of the tibiofemoral compartment.  Degenerative changes are most notable in the patellofemoral compartment with significant cartilage loss.      Assessment: Primary osteoarthritis of both knees  -     Large Joint Aspiration/Injection: bilateral knee    Morbid obesity        Plan:  We will proceed with bilateral Durolane injections today.  I will see her back in 4 months.  She verbalized understanding of the plan of care with no further " questions.    Kan Chiu MD personally performed the services described in this documentation, including but not limited to patient's history, physical examination, and assessment and plan of care. All medical record entries made by CLARISSA Jason were performed at his direction and in his presence. The medical record was reviewed and is accurate and complete.    Large Joint Aspiration/Injection: bilateral knee    Date/Time: 3/21/2024 8:30 AM    Performed by: Kan Chiu MD  Authorized by: Kan Chiu MD    Consent Done?:  Yes (Verbal)  Indications:  Pain  Site marked: the procedure site was marked    Timeout: prior to procedure the correct patient, procedure, and site was verified    Prep: patient was prepped and draped in usual sterile fashion      Local anesthesia used?: Yes    Local anesthetic:  Topical anesthetic and lidocaine 2% without epinephrine    Details:  Needle Size:  18 G  Ultrasonic Guidance for needle placement?: No    Approach:  Superior  Location:  Knee  Laterality:  Bilateral  Site:  Bilateral knee  Medications (Right):  2 mg LIDOcaine HCL 20 mg/ml (2%) 20 mg/mL (2 %)  Medications (Left):  2 mg LIDOcaine HCL 20 mg/ml (2%) 20 mg/mL (2 %)  Patient tolerance:  Patient tolerated the procedure well with no immediate complications      No follow-ups on file.      DISCLAIMER: This note may have been dictated using voice recognition software and may contain grammatical errors.     NOTE: Consult report sent to referring provider via [employer] [facility].

## 2024-10-15 ENCOUNTER — TELEPHONE (OUTPATIENT)
Dept: ORTHOPEDICS | Facility: CLINIC | Age: 44
End: 2024-10-15
Payer: COMMERCIAL

## 2024-10-15 DIAGNOSIS — M17.0 PRIMARY OSTEOARTHRITIS OF BOTH KNEES: Primary | ICD-10-CM

## 2024-11-07 ENCOUNTER — OFFICE VISIT (OUTPATIENT)
Dept: ORTHOPEDICS | Facility: CLINIC | Age: 44
End: 2024-11-07
Payer: COMMERCIAL

## 2024-11-07 VITALS
SYSTOLIC BLOOD PRESSURE: 118 MMHG | DIASTOLIC BLOOD PRESSURE: 81 MMHG | HEIGHT: 65 IN | WEIGHT: 292.63 LBS | BODY MASS INDEX: 48.75 KG/M2 | HEART RATE: 78 BPM

## 2024-11-07 DIAGNOSIS — M17.0 PRIMARY OSTEOARTHRITIS OF BOTH KNEES: Primary | ICD-10-CM

## 2024-11-07 DIAGNOSIS — E66.01 MORBID OBESITY: ICD-10-CM

## 2024-11-07 PROCEDURE — 99499 UNLISTED E&M SERVICE: CPT | Mod: ,,,

## 2024-11-07 PROCEDURE — 1160F RVW MEDS BY RX/DR IN RCRD: CPT | Mod: CPTII,,,

## 2024-11-07 PROCEDURE — 1159F MED LIST DOCD IN RCRD: CPT | Mod: CPTII,,,

## 2024-11-07 PROCEDURE — 3074F SYST BP LT 130 MM HG: CPT | Mod: CPTII,,,

## 2024-11-07 PROCEDURE — 3008F BODY MASS INDEX DOCD: CPT | Mod: CPTII,,,

## 2024-11-07 PROCEDURE — 3079F DIAST BP 80-89 MM HG: CPT | Mod: CPTII,,,

## 2024-11-07 PROCEDURE — 20610 DRAIN/INJ JOINT/BURSA W/O US: CPT | Mod: LT,,,

## 2024-11-07 PROCEDURE — 3044F HG A1C LEVEL LT 7.0%: CPT | Mod: CPTII,,,

## 2024-11-07 RX ORDER — LIDOCAINE HYDROCHLORIDE 20 MG/ML
3 INJECTION, SOLUTION INFILTRATION; PERINEURAL
Status: DISCONTINUED | OUTPATIENT
Start: 2024-11-07 | End: 2024-11-07 | Stop reason: HOSPADM

## 2024-11-07 RX ADMIN — LIDOCAINE HYDROCHLORIDE 3 ML: 20 INJECTION, SOLUTION INFILTRATION; PERINEURAL at 01:11

## 2024-11-07 NOTE — PROGRESS NOTES
[facility] Clinic  Orthopedic Clinic Note      Chief Complaint:   Chief Complaint   Patient presents with    Injections     Patient is here for lt knee Duralone injection, last injection given 3/21/24.      Referring Physician: Kan Chiu MD      History of Present Illness:    This is a 43 y.o. year old female presenting with some left wrist pain/numbness. Has been noticing it since about Oct 2022. Does not remember an exact injury. Has trouble sometimes with picking things up. Has been wearing a night brace that has helped.  She has a diagnosis of bilateral knee osteoarthritis but she is doing well with that.  2024 patient presents for follow-up on bilateral knee pain.  She received bilateral knee viscosupplementation injections at her prior visit.  They were effective for approximately a year.  She reports that her symptoms have returned and are inhibiting her ability to be more active lately.  She would like to repeat viscosupplementation injections.  2024 this patient comes in for bilateral delayed injections today.  Her symptoms have not changed.  2024 patient presents for left knee viscosupplementation injection.  She states that her right knee is still doing quite well, but she continues to have symptoms in her left knee.       Past Medical History:   Diagnosis Date    Thyroid disease        Past Surgical History:   Procedure Laterality Date     SECTION         Current Outpatient Medications   Medication Sig    gabapentin (NEURONTIN) 600 MG tablet Take 1 tablet (600 mg total) by mouth nightly.    levothyroxine (SYNTHROID, LEVOTHROID) 175 MCG tablet Take 175 mcg by mouth once daily.    lisinopriL (PRINIVIL,ZESTRIL) 5 MG tablet Take 5 mg by mouth.    norethindrone (MICRONOR) 0.35 mg tablet Take 1 tablet by mouth.    progesterone (PROMETRIUM) 200 MG capsule INSERT 1 CAPSULE IN THE VAGINA EVERY NIGHT AT BEDTIME AS DIRECTED    diclofenac (VOLTAREN) 50 MG EC tablet Take 50 mg by  "mouth 2 (two) times daily. (Patient not taking: Reported on 11/7/2024)     No current facility-administered medications for this visit.       Review of patient's allergies indicates:  No Known Allergies    Family History   Problem Relation Name Age of Onset    No Known Problems Mother      No Known Problems Father      No Known Problems Sister      No Known Problems Brother         Social History     Socioeconomic History    Marital status: Single   Tobacco Use    Smoking status: Never    Smokeless tobacco: Never   Substance and Sexual Activity    Alcohol use: Not Currently    Drug use: Never    Sexual activity: Not Currently           Review of Systems:  All review of systems negative except for those stated in the HPI.    Examination:    Vital Signs:    Vitals:    11/07/24 1349   BP: 118/81   Pulse: 78   Weight: 132.7 kg (292 lb 9.6 oz)   Height: 5' 5" (1.651 m)       Body mass index is 48.69 kg/m².    Physical Examination:  General: Well-developed, well-nourished.  Neuro: Alert and oriented x 3.  Psych: Normal mood and affect.  Card: Regular rate and rhythm.  Resp: Unlabored and quiet.  Bilateral knee Exam:  Varus deformity. Range of motion from 0-130 degrees. Negative patella grind and equal subluxation of knee cap medial and lateral < 1cm. Negative patella tendon tenderness. Negative Lachman and anterior drawer test. Negative posterior drawer test. Negative varus and valgus stress test. Positive medial joint line tenderness. Negative lateral joint line tenderness. 4/5 strength and normal skin appearance. Sensibility normal.       Imaging:  Prior 4 views of the bilateral knees demonstrate degenerative changes with joint space narrowing of the medial aspect of the tibiofemoral compartment.  Degenerative changes are most notable in the patellofemoral compartment with significant cartilage loss.      Assessment: Primary osteoarthritis of both knees  -     Large Joint Aspiration/Injection: L knee    Morbid " obesity      Plan:  Plan to proceed with left knee viscosupplementation injection today.  Encouraged over-the-counter medications as needed for pain.  Ice application, rest, elevation as needed for swelling.  Home exercise program with activity modification as necessary.  She will return to clinic in approximately 3-4 months for re-evaluation.  She verbalized understanding of the plan of care with no further questions.      Large Joint Aspiration/Injection: L knee    Date/Time: 11/7/2024 1:30 PM    Performed by: Fabiana Callejas FNP  Authorized by: Fabiana Callejas FNP    Consent Done?:  Yes (Verbal)  Indications:  Pain  Site marked: the procedure site was marked    Timeout: prior to procedure the correct patient, procedure, and site was verified    Prep: patient was prepped and draped in usual sterile fashion      Local anesthesia used?: Yes    Local anesthetic:  Topical anesthetic  Ultrasonic Guidance for needle placement?: No    Approach:  Anterolateral (superolateral)  Location:  Knee  Site:  L knee  Medications:  3 mL LIDOcaine HCL 20 mg/ml (2%) 20 mg/mL (2 %); 60 mg hyaluronate sodium, stabilized (DUROLANE) 60 mg/3 mL  Patient tolerance:  Patient tolerated the procedure well with no immediate complications      Follow up in about 3 months (around 2/7/2025) for Reevaluation.      DISCLAIMER: This note may have been dictated using voice recognition software and may contain grammatical errors.     NOTE: Consult report sent to referring provider via [employer] [facility].

## 2025-04-03 ENCOUNTER — OFFICE VISIT (OUTPATIENT)
Dept: ORTHOPEDICS | Facility: CLINIC | Age: 45
End: 2025-04-03
Payer: COMMERCIAL

## 2025-04-03 VITALS
DIASTOLIC BLOOD PRESSURE: 80 MMHG | HEART RATE: 80 BPM | HEIGHT: 65 IN | BODY MASS INDEX: 48.74 KG/M2 | WEIGHT: 292.56 LBS | SYSTOLIC BLOOD PRESSURE: 124 MMHG

## 2025-04-03 DIAGNOSIS — M17.0 PRIMARY OSTEOARTHRITIS OF BOTH KNEES: Primary | ICD-10-CM

## 2025-04-03 DIAGNOSIS — E66.01 MORBID OBESITY: ICD-10-CM

## 2025-04-03 RX ORDER — LIDOCAINE HYDROCHLORIDE 20 MG/ML
2 INJECTION, SOLUTION INFILTRATION; PERINEURAL
Status: DISCONTINUED | OUTPATIENT
Start: 2025-04-03 | End: 2025-04-03 | Stop reason: HOSPADM

## 2025-04-03 RX ORDER — BETAMETHASONE SODIUM PHOSPHATE AND BETAMETHASONE ACETATE 3; 3 MG/ML; MG/ML
6 INJECTION, SUSPENSION INTRA-ARTICULAR; INTRALESIONAL; INTRAMUSCULAR; SOFT TISSUE
Status: DISCONTINUED | OUTPATIENT
Start: 2025-04-03 | End: 2025-04-03 | Stop reason: HOSPADM

## 2025-04-03 RX ADMIN — BETAMETHASONE SODIUM PHOSPHATE AND BETAMETHASONE ACETATE 6 MG: 3; 3 INJECTION, SUSPENSION INTRA-ARTICULAR; INTRALESIONAL; INTRAMUSCULAR; SOFT TISSUE at 01:04

## 2025-04-03 RX ADMIN — LIDOCAINE HYDROCHLORIDE 2 MG: 20 INJECTION, SOLUTION INFILTRATION; PERINEURAL at 01:04

## 2025-04-03 NOTE — PROGRESS NOTES
"Subjective:      Patient ID: Rosa Hamm is a 44 y.o. female.    Chief Complaint: Follow-up (Lt knee  f/u durolane inj on 24, wbat, ambulates without assistance, reports after this inj relief did not last long, reports having an increase in pain, )    HPI:     History of Present Illness    CHIEF COMPLAINT:  - Left knee pain    HPI:  Rosa presents with bilateral knee osteoarthritis, with primary focus on left knee pain. Her left knee is not functioning well following a recent Duralane injection on 2024. This is her second Duralane injection, with the first being somewhat effective but the current one providing no relief. Previously, she had received Synvisc injections, which were helpful. She finds it challenging to become more active due to knee pain.    She has been on Adipex for weight loss for two months, prescribed by her internal medicine provider who manages her thyroid. She reports losing almost 20 lbs and experiencing appetite suppression with the medication.    She denies any medical diagnoses.    PREVIOUS TREATMENTS:  - Left knee Duralane injection on 2024, which provided no benefit  - Previous Synvisc injections to the left knee, which were helpful  - Two rounds of Duralane injections to the left knee, with the first one providing some benefit and the second providing no benefit    MEDICATIONS:  - Adipex: for weight loss, resulting in almost 20 lbs of weight loss    WORK STATUS:  - Works at [email or web address]      ROS:  Constitutional: +loss in appetite  Musculoskeletal: +joint pain, +limited movement, +pain with movement          Past Medical History:   Diagnosis Date    Thyroid disease      Past Surgical History:   Procedure Laterality Date     SECTION       Social History[1]    Current Medications[2]  Review of patient's allergies indicates:  No Known Allergies    /80   Pulse 80   Ht 5' 5" (1.651 m)   Wt 132.7 kg (292 lb 8.8 oz)   BMI 48.68 kg/m²     Comprehensive " review of systems completed and negative except as per HPI.        Objective:   General: Well-developed, well-nourished.  Neuro: Alert and oriented x 3.  Psych: Normal mood and affect.  Card: Regular rate and rhythm  Resp: Respirations regular and unlabored    Knee Exam:  Varus deformity. Range of motion from 5-110 degrees. Negative patella grind and equal subluxation of knee cap medial and lateral < 1cm. Negative patella tendon tenderness. Negative Lachman and anterior drawer test. Negative posterior drawer test. Negative varus and valgus stress test. Positive medial joint line tenderness. Negative lateral joint line tenderness. 4/5 strength and normal skin appearance. Sensibility normal.        Assessment:         1. Primary osteoarthritis of both knees    2. Morbid obesity          Plan:       Orders Placed This Encounter    Large Joint Aspiration/Injection: L knee        Imaging and exam findings discussed.     Assessment & Plan    PROCEDURES:  - Left knee steroid injection administered today.  - Discussed possibility of future gel injection in 6 months if needed.  - Noted that previous gel injections (Duralane) were not effective for the patient.    FOLLOW UP:  - Follow up in 4 months.    PATIENT INSTRUCTIONS:  - Focus on diet and nutrition for weight loss, as mobility is limited due to knee pain.  - Consider aquatic therapy or pool exercise programs for low-impact activity.         Large Joint Aspiration/Injection: L knee    Date/Time: 4/3/2025 1:30 PM    Performed by: Kan Chiu MD  Authorized by: Kan Chiu MD    Consent Done?:  Yes (Verbal)  Indications:  Pain  Timeout: prior to procedure the correct patient, procedure, and site was verified    Prep: patient was prepped and draped in usual sterile fashion      Details:  Needle Size:  25 G  Approach:  Anterolateral  Location:  Knee  Site:  L knee  Medications:  2 mg LIDOcaine HCL 20 mg/ml (2%) 20 mg/mL (2 %); 6 mg betamethasone acetate-betamethasone  sodium phosphate 6 mg/mL  Patient tolerance:  Patient tolerated the procedure well with no immediate complications       All questions were answered. Patient happy and in agreement with the plan.     This note was generated with the assistance of ambient listening technology. Verbal consent was obtained by the patient and accompanying visitor(s) for the recording of patient appointment to facilitate this note. I attest to having reviewed and edited the generated note for accuracy, though some syntax or spelling errors may persist. Please contact the author of this note for any clarification.         [1]   Social History  Socioeconomic History    Marital status: Single   Tobacco Use    Smoking status: Never    Smokeless tobacco: Never   Substance and Sexual Activity    Alcohol use: Not Currently    Drug use: Never    Sexual activity: Not Currently   [2]   Current Outpatient Medications:     gabapentin (NEURONTIN) 600 MG tablet, Take 1 tablet (600 mg total) by mouth nightly., Disp: 40 tablet, Rfl: 0    levothyroxine (SYNTHROID, LEVOTHROID) 175 MCG tablet, Take 175 mcg by mouth once daily., Disp: , Rfl:     lisinopriL (PRINIVIL,ZESTRIL) 5 MG tablet, Take 5 mg by mouth., Disp: , Rfl:     norethindrone (MICRONOR) 0.35 mg tablet, Take 1 tablet by mouth., Disp: , Rfl:     progesterone (PROMETRIUM) 200 MG capsule, INSERT 1 CAPSULE IN THE VAGINA EVERY NIGHT AT BEDTIME AS DIRECTED, Disp: , Rfl:     diclofenac (VOLTAREN) 50 MG EC tablet, Take 50 mg by mouth 2 (two) times daily. (Patient not taking: Reported on 4/3/2025), Disp: , Rfl:

## 2025-04-03 NOTE — PROCEDURES
Large Joint Aspiration/Injection: L knee    Date/Time: 4/3/2025 1:30 PM    Performed by: Kan Chiu MD  Authorized by: Kan Chiu MD    Consent Done?:  Yes (Verbal)  Indications:  Pain  Timeout: prior to procedure the correct patient, procedure, and site was verified    Prep: patient was prepped and draped in usual sterile fashion      Details:  Needle Size:  25 G  Approach:  Anterolateral  Location:  Knee  Site:  L knee  Medications:  2 mg LIDOcaine HCL 20 mg/ml (2%) 20 mg/mL (2 %); 6 mg betamethasone acetate-betamethasone sodium phosphate 6 mg/mL  Patient tolerance:  Patient tolerated the procedure well with no immediate complications

## 2025-05-19 ENCOUNTER — TELEPHONE (OUTPATIENT)
Dept: ORTHOPEDICS | Facility: CLINIC | Age: 45
End: 2025-05-19
Payer: COMMERCIAL

## 2025-05-19 DIAGNOSIS — M17.0 PRIMARY OSTEOARTHRITIS OF BOTH KNEES: Primary | ICD-10-CM

## 2025-05-19 NOTE — TELEPHONE ENCOUNTER
Patient requesting call back about appointment for injection in knee. No answer when returning phone call.

## 2025-06-05 ENCOUNTER — OFFICE VISIT (OUTPATIENT)
Dept: ORTHOPEDICS | Facility: CLINIC | Age: 45
End: 2025-06-05
Payer: COMMERCIAL

## 2025-06-05 ENCOUNTER — HOSPITAL ENCOUNTER (OUTPATIENT)
Dept: RADIOLOGY | Facility: CLINIC | Age: 45
Discharge: HOME | End: 2025-06-05
Attending: ORTHOPAEDIC SURGERY
Payer: COMMERCIAL

## 2025-06-05 VITALS
DIASTOLIC BLOOD PRESSURE: 84 MMHG | SYSTOLIC BLOOD PRESSURE: 122 MMHG | HEART RATE: 66 BPM | HEIGHT: 65 IN | BODY MASS INDEX: 48.74 KG/M2 | WEIGHT: 292.56 LBS

## 2025-06-05 DIAGNOSIS — M17.0 PRIMARY OSTEOARTHRITIS OF BOTH KNEES: Primary | ICD-10-CM

## 2025-06-05 DIAGNOSIS — M17.0 PRIMARY OSTEOARTHRITIS OF BOTH KNEES: ICD-10-CM

## 2025-06-05 PROCEDURE — 73564 X-RAY EXAM KNEE 4 OR MORE: CPT | Mod: 50,,, | Performed by: ORTHOPAEDIC SURGERY

## 2025-06-05 RX ORDER — LIDOCAINE HYDROCHLORIDE 20 MG/ML
3 INJECTION, SOLUTION INFILTRATION; PERINEURAL
Status: DISCONTINUED | OUTPATIENT
Start: 2025-06-05 | End: 2025-06-05 | Stop reason: HOSPADM

## 2025-06-05 RX ADMIN — LIDOCAINE HYDROCHLORIDE 3 MG: 20 INJECTION, SOLUTION INFILTRATION; PERINEURAL at 10:06

## 2025-06-12 ENCOUNTER — TELEPHONE (OUTPATIENT)
Dept: SURGERY | Facility: CLINIC | Age: 45
End: 2025-06-12
Payer: COMMERCIAL